# Patient Record
Sex: MALE | Race: WHITE | NOT HISPANIC OR LATINO | URBAN - METROPOLITAN AREA
[De-identification: names, ages, dates, MRNs, and addresses within clinical notes are randomized per-mention and may not be internally consistent; named-entity substitution may affect disease eponyms.]

---

## 2018-04-12 LAB
ANION GAP SERPL CALCULATED.3IONS-SCNC: 15.4 MM/L
APTT PPP: 36.5 SEC (ref 24.4–37.6)
BACTERIA UR QL AUTO: ABNORMAL /HPF
BASOPHILS # BLD AUTO: 0.1 X3/UL (ref 0–0.3)
BASOPHILS # BLD AUTO: 1.3 % (ref 0–2)
BILIRUB UR QL STRIP: NEGATIVE
BUN SERPL-MCNC: 13 MG/DL (ref 7–25)
CALCIUM SERPL-MCNC: 9.7 MG/DL (ref 8.6–10.5)
CHLORIDE SERPL-SCNC: 104 MM/L (ref 98–107)
CLARITY UR: CLEAR
CO2 SERPL-SCNC: 24 MM/L (ref 21–31)
COLOR UR: YELLOW
CREAT SERPL-MCNC: 1.2 MG/DL (ref 0.7–1.3)
DEPRECATED RDW RBC AUTO: 14.2 %
EGFR (HISTORICAL): > 60 GFR
EGFR AFRICAN AMERICAN (HISTORICAL): > 60 GFR
EOSINOPHIL # BLD AUTO: 0.3 X3/UL (ref 0–0.5)
EOSINOPHIL NFR BLD AUTO: 2.4 % (ref 0–5)
GLUCOSE (HISTORICAL): 95 MG/DL (ref 65–99)
GLUCOSE UR STRIP-MCNC: NEGATIVE MG/DL
HCT VFR BLD AUTO: 43.4 % (ref 42–52)
HGB BLD-MCNC: 14.7 G/DL (ref 14–18)
HGB UR QL STRIP.AUTO: ABNORMAL
INR PPP: 1.04 (ref 0.9–1.5)
KETONES UR STRIP-MCNC: NEGATIVE MG/DL
LEUKOCYTE ESTERASE UR QL STRIP: NEGATIVE
LYMPHOCYTES # BLD AUTO: 2.1 X3/UL (ref 1.2–4.2)
LYMPHOCYTES NFR BLD AUTO: 19.8 % (ref 20.5–51.1)
MCH RBC QN AUTO: 28.8 PG (ref 26–34)
MCHC RBC AUTO-ENTMCNC: 33.9 G/DL (ref 31–37)
MCV RBC AUTO: 85.2 FL (ref 81–99)
MONOCYTES # BLD AUTO: 0.8 X3/UL (ref 0–1)
MONOCYTES NFR BLD AUTO: 7.7 % (ref 1.7–12)
MUCUS THREADS (HISTORICAL): PRESENT
NEUTROPHILS # BLD AUTO: 7.4 X3/UL (ref 1.4–6.5)
NEUTS SEG NFR BLD AUTO: 68.8 % (ref 42.2–75.2)
NITRITE UR QL STRIP: NEGATIVE
NON-SQ EPI CELLS URNS QL MICRO: ABNORMAL /LPF
OSMOLALITY, SERUM (HISTORICAL): 277 MOSM (ref 262–291)
PH UR STRIP.AUTO: 5.5 [PH] (ref 4.5–8)
PLATELET # BLD AUTO: 389 X3/UL (ref 130–400)
PMV BLD AUTO: 8 FL
POTASSIUM SERPL-SCNC: 4.4 MM/L (ref 3.5–5.5)
PROT UR STRIP-MCNC: NEGATIVE MG/DL
PROTHROMBIN TIME (HISTORICAL): 12 SEC (ref 10.1–12.9)
RBC # BLD AUTO: 5.09 X6/UL (ref 4.3–5.9)
RBC #/AREA URNS AUTO: ABNORMAL /HPF
SODIUM SERPL-SCNC: 139 MM/L (ref 134–143)
SP GR UR STRIP.AUTO: >= 1.03 (ref 1–1.03)
UROBILINOGEN UR QL STRIP.AUTO: 0.2 EU/DL (ref 0.2–8)
WBC # BLD AUTO: 10.7 X3/UL (ref 4.8–10.8)
WBC #/AREA URNS AUTO: ABNORMAL /HPF

## 2019-02-25 ENCOUNTER — HOSPITAL ENCOUNTER (EMERGENCY)
Facility: HOSPITAL | Age: 44
Discharge: HOME | End: 2019-02-25
Attending: EMERGENCY MEDICINE | Admitting: EMERGENCY MEDICINE

## 2019-02-25 VITALS
HEIGHT: 70 IN | WEIGHT: 260 LBS | RESPIRATION RATE: 16 BRPM | SYSTOLIC BLOOD PRESSURE: 146 MMHG | HEART RATE: 93 BPM | TEMPERATURE: 99 F | BODY MASS INDEX: 37.22 KG/M2 | DIASTOLIC BLOOD PRESSURE: 96 MMHG | OXYGEN SATURATION: 97 %

## 2019-02-25 DIAGNOSIS — K05.10 GINGIVITIS: Primary | ICD-10-CM

## 2019-02-25 PROCEDURE — 99282 EMERGENCY DEPT VISIT SF MDM: CPT

## 2019-02-25 PROCEDURE — 96372 THER/PROPH/DIAG INJ SC/IM: CPT

## 2019-02-25 RX ORDER — CLINDAMYCIN HYDROCHLORIDE 300 MG/1
300 CAPSULE ORAL EVERY 6 HOURS
Qty: 40 CAPSULE | Refills: 0 | Status: SHIPPED | OUTPATIENT
Start: 2019-02-25 | End: 2019-03-07

## 2019-02-25 RX ORDER — DEXAMETHASONE SODIUM PHOSPHATE 4 MG/ML
4.5 INJECTION, SOLUTION INTRA-ARTICULAR; INTRALESIONAL; INTRAMUSCULAR; INTRAVENOUS; SOFT TISSUE ONCE
Status: COMPLETED | OUTPATIENT
Start: 2019-02-25 | End: 2019-02-25

## 2019-02-25 RX ORDER — CLINDAMYCIN HYDROCHLORIDE 150 MG/1
300 CAPSULE ORAL ONCE
Status: COMPLETED | OUTPATIENT
Start: 2019-02-25 | End: 2019-02-25

## 2019-02-25 RX ORDER — METHYLPREDNISOLONE 4 MG/1
TABLET ORAL
Qty: 21 TABLET | Refills: 0 | Status: SHIPPED | OUTPATIENT
Start: 2019-02-25 | End: 2020-08-23

## 2019-02-25 RX ADMIN — CLINDAMYCIN HYDROCHLORIDE 300 MG: 150 CAPSULE ORAL at 18:02

## 2019-02-25 RX ADMIN — DEXAMETHASONE SODIUM PHOSPHATE 4.5 MG: 4 INJECTION, SOLUTION INTRAMUSCULAR; INTRAVENOUS at 18:01

## 2019-02-25 NOTE — ED PROVIDER NOTES
History  Chief Complaint   Patient presents with    Oral Swelling     drainage     Patient reports to the emergency department with complaint of dental and gum pain  Patient for approximately 2 weeks has had swelling in the gums with discharge and foul taste  Patient was taking over-the-counter ibuprofen and Tylenol with little relief  Today patient began to have a headache emanating from his left lower gums and radiated to his left face and into the left jaw  History provided by:  Patient and spouse      None       History reviewed  No pertinent past medical history  Past Surgical History:   Procedure Laterality Date    ARTHROSCOPY KNEE      FASCIOTOMY      LEG SURGERY      TONSILLECTOMY      VASECTOMY         History reviewed  No pertinent family history  I have reviewed and agree with the history as documented  Social History     Tobacco Use    Smoking status: Never Smoker    Smokeless tobacco: Current User     Types: Chew   Substance Use Topics    Alcohol use: Not Currently    Drug use: Never        Review of Systems   Constitutional: Negative for chills and fever  HENT: Positive for dental problem  Negative for rhinorrhea and sore throat  Swelling arms with discharge for 2 weeks getting worse over time   Eyes: Negative for visual disturbance  Respiratory: Negative for cough and shortness of breath  Cardiovascular: Negative for chest pain and leg swelling  Gastrointestinal: Negative for abdominal pain, diarrhea, nausea and vomiting  Genitourinary: Negative for dysuria  Musculoskeletal: Negative for back pain and myalgias  Skin: Negative for rash  Neurological: Negative for dizziness and headaches  Psychiatric/Behavioral: Negative for confusion  All other systems reviewed and are negative  Physical Exam  Physical Exam   Constitutional: He is oriented to person, place, and time  He appears well-developed and well-nourished     HENT:   Head: Normocephalic and atraumatic  Right Ear: External ear normal    Left Ear: External ear normal    Nose: Nose normal    Mouth/Throat: Oropharynx is clear and moist    Left lower gingiva with edema and scant foamy discharge, there are no teeth in the area of the gingival edema, there is tenderness with palpation to the left injuring without fluctuance or abscess palpable, there is tenderness with palpation of the left lower jaw externally  There is some missing dentition as well as some scant case seen in other molars  Eyes: Pupils are equal, round, and reactive to light  Conjunctivae and EOM are normal  No scleral icterus  Neck: Normal range of motion  Neck supple  No JVD present  No tracheal deviation present  Pulmonary/Chest: Effort normal  No respiratory distress  Musculoskeletal: Normal range of motion  He exhibits no edema or tenderness  Lymphadenopathy:     He has cervical adenopathy (Large tender left anterior cervical nodes upper chain)  Neurological: He is alert and oriented to person, place, and time  No cranial nerve deficit or sensory deficit  He exhibits normal muscle tone  5/5 motor, nl sens   Skin: Skin is warm and dry  Capillary refill takes less than 2 seconds  Psychiatric: He has a normal mood and affect  His behavior is normal    Nursing note and vitals reviewed        Vital Signs  ED Triage Vitals [02/25/19 1752]   Temperature Pulse Respirations Blood Pressure SpO2   99 °F (37 2 °C) 93 16 146/96 97 %      Temp Source Heart Rate Source Patient Position - Orthostatic VS BP Location FiO2 (%)   Tympanic -- Sitting Left arm --      Pain Score       6           Vitals:    02/25/19 1752   BP: 146/96   Pulse: 93   Patient Position - Orthostatic VS: Sitting       Visual Acuity      ED Medications  Medications   dexamethasone (DECADRON) injection 4 5 mg (4 5 mg Intramuscular Given 2/25/19 1801)   clindamycin (CLEOCIN) capsule 300 mg (300 mg Oral Given 2/25/19 1802)       Diagnostic Studies  Results Reviewed     None                 No orders to display              Procedures  Procedures       Phone Contacts  ED Phone Contact    ED Course                               MDM    Disposition  Final diagnoses:   Gingivitis     Time reflects when diagnosis was documented in both MDM as applicable and the Disposition within this note     Time User Action Codes Description Comment    2/25/2019  5:58 PM Richard Suncrest Add [K08 89] Pain, dental     2/25/2019  5:59 PM Richard Suncrest Remove [K08 89] Pain, dental     2/25/2019  5:59 PM [facility], [id number] [U57 [id number]] Gingivitis       ED Disposition     ED Disposition Condition Date/Time Comment    Discharge Stable Mon Feb 25, 2019  5:58 PM Kat Fail discharge to home/self care  Follow-up Information     Follow up With Specialties Details Why Contact Info      Schedule an appointment as soon as possible for a visit in 3 days  See your dentist on Thursday 2-  Discharge Medication List as of 2/25/2019  6:03 PM      START taking these medications    Details   clindamycin (CLEOCIN) 300 MG capsule Take 1 capsule (300 mg total) by mouth every 6 (six) hours for 10 days, Starting Mon 2/25/2019, Until Thu 3/7/2019, Print      methylPREDNISolone 4 MG tablet therapy pack Use as directed on package, Print           No discharge procedures on file      ED Provider  Electronically Signed by           Loren Miller DO  02/25/19 0430

## 2019-04-30 ENCOUNTER — HOSPITAL ENCOUNTER (EMERGENCY)
Facility: HOSPITAL | Age: 44
Discharge: HOME | End: 2019-04-30
Attending: EMERGENCY MEDICINE | Admitting: EMERGENCY MEDICINE

## 2019-04-30 ENCOUNTER — APPOINTMENT (EMERGENCY)
Dept: RADIOLOGY | Facility: HOSPITAL | Age: 44
End: 2019-04-30

## 2019-04-30 VITALS
BODY MASS INDEX: 37.22 KG/M2 | RESPIRATION RATE: 18 BRPM | DIASTOLIC BLOOD PRESSURE: 100 MMHG | OXYGEN SATURATION: 96 % | HEIGHT: 70 IN | SYSTOLIC BLOOD PRESSURE: 145 MMHG | WEIGHT: 260 LBS | HEART RATE: 91 BPM | TEMPERATURE: 97.2 F

## 2019-04-30 DIAGNOSIS — M25.562 LEFT KNEE PAIN: Primary | ICD-10-CM

## 2019-04-30 PROCEDURE — 73562 X-RAY EXAM OF KNEE 3: CPT

## 2019-04-30 PROCEDURE — 96372 THER/PROPH/DIAG INJ SC/IM: CPT

## 2019-04-30 PROCEDURE — 99283 EMERGENCY DEPT VISIT LOW MDM: CPT

## 2019-04-30 RX ORDER — KETOROLAC TROMETHAMINE 30 MG/ML
30 INJECTION, SOLUTION INTRAMUSCULAR; INTRAVENOUS ONCE
Status: DISCONTINUED | OUTPATIENT
Start: 2019-04-30 | End: 2019-04-30 | Stop reason: HOSPADM

## 2019-04-30 RX ORDER — ACETAMINOPHEN AND CODEINE PHOSPHATE 300; 30 MG/1; MG/1
1-2 TABLET ORAL EVERY 6 HOURS PRN
Qty: 15 TABLET | Refills: 0 | Status: SHIPPED | OUTPATIENT
Start: 2019-04-30 | End: 2019-05-10

## 2019-04-30 RX ORDER — MORPHINE SULFATE 4 MG/ML
4 INJECTION, SOLUTION INTRAMUSCULAR; INTRAVENOUS ONCE
Status: COMPLETED | OUTPATIENT
Start: 2019-04-30 | End: 2019-04-30

## 2019-04-30 RX ORDER — HYDROCODONE BITARTRATE AND ACETAMINOPHEN 5; 325 MG/1; MG/1
1 TABLET ORAL ONCE
Status: COMPLETED | OUTPATIENT
Start: 2019-04-30 | End: 2019-04-30

## 2019-04-30 RX ORDER — NAPROXEN 500 MG/1
500 TABLET ORAL 2 TIMES DAILY PRN
Qty: 30 TABLET | Refills: 0 | Status: SHIPPED | OUTPATIENT
Start: 2019-04-30 | End: 2020-08-23

## 2019-04-30 RX ADMIN — MORPHINE SULFATE 4 MG: 4 INJECTION INTRAVENOUS at 00:47

## 2019-04-30 RX ADMIN — HYDROCODONE BITARTRATE AND ACETAMINOPHEN 1 TABLET: 5; 325 TABLET ORAL at 02:02

## 2019-07-29 ENCOUNTER — OFFICE VISIT (OUTPATIENT)
Dept: URGENT CARE | Facility: CLINIC | Age: 44
End: 2019-07-29
Payer: COMMERCIAL

## 2019-07-29 VITALS
HEART RATE: 85 BPM | SYSTOLIC BLOOD PRESSURE: 120 MMHG | RESPIRATION RATE: 20 BRPM | HEIGHT: 70 IN | TEMPERATURE: 98 F | BODY MASS INDEX: 36.79 KG/M2 | WEIGHT: 257 LBS | DIASTOLIC BLOOD PRESSURE: 70 MMHG | OXYGEN SATURATION: 99 %

## 2019-07-29 DIAGNOSIS — M10.9 ACUTE GOUT INVOLVING TOE OF RIGHT FOOT, UNSPECIFIED CAUSE: Primary | ICD-10-CM

## 2019-07-29 PROCEDURE — 96372 THER/PROPH/DIAG INJ SC/IM: CPT | Performed by: PHYSICIAN ASSISTANT

## 2019-07-29 PROCEDURE — G0382 LEV 3 HOSP TYPE B ED VISIT: HCPCS | Performed by: PHYSICIAN ASSISTANT

## 2019-07-29 RX ORDER — DEXAMETHASONE SODIUM PHOSPHATE 10 MG/ML
10 INJECTION, SOLUTION INTRAMUSCULAR; INTRAVENOUS ONCE
Status: COMPLETED | OUTPATIENT
Start: 2019-07-29 | End: 2019-07-29

## 2019-07-29 RX ADMIN — DEXAMETHASONE SODIUM PHOSPHATE 10 MG: 10 INJECTION, SOLUTION INTRAMUSCULAR; INTRAVENOUS at 19:29

## 2019-07-29 NOTE — PROGRESS NOTES
[facility]        NAME: Gus Nassar is a 37 y o  male  : 1975    MRN: [id number]  DATE: 2019  TIME: 7:29 PM    Assessment and Plan   Acute gout involving toe of right foot, unspecified cause [M10 9]  1  Acute gout involving toe of right foot, unspecified cause  dexamethasone (PF) (DECADRON) injection 10 mg         Patient Instructions     Take Tylenol or Motrin as needed for pain  The steroid injection number given today will last 72 hours  Follow up with PCP in 3-5 days  Proceed to  ER if symptoms worsen  Chief Complaint     Chief Complaint   Patient presents with    Foot Pain     right great toe and top of foot pain for 1 day         History of Present Illness       Patient presents with redness swelling and pain to the right great toe since this morning  There is no known injury but patient states he could have struck it off the foot oard while sleeping  Previous history of gout  There is no ecchymosis  Review of Systems   Review of Systems   Constitutional: Negative for chills and fever  Gastrointestinal: Negative for nausea and vomiting  Musculoskeletal: Positive for arthralgias  Skin: Negative for rash  Neurological: Negative for weakness and numbness  Hematological: Negative for adenopathy           Current Medications       Current Outpatient Medications:     methylPREDNISolone 4 MG tablet therapy pack, Use as directed on package (Patient not taking: Reported on 2019), Disp: 21 tablet, Rfl: 0    naproxen (NAPROSYN) 500 mg tablet, Take 1 tablet (500 mg total) by mouth 2 (two) times a day as needed for mild pain or moderate pain (Patient not taking: Reported on 2019), Disp: 30 tablet, Rfl: 0    Current Facility-Administered Medications:     dexamethasone (PF) (DECADRON) injection 10 mg, 10 mg, Intramuscular, Once, Keegan Jones PA-C    Current Allergies     Allergies as of 2019 - Reviewed 2019   Allergen Reaction Noted    Penicillins Hives and Throat Swelling 10/19/2009    Tramadol  02/25/2019            The following portions of the patient's history were reviewed and updated as appropriate: allergies, current medications, past family history, past medical history, past social history, past surgical history and problem list      History reviewed  No pertinent past medical history  Past Surgical History:   Procedure Laterality Date    ARTHROSCOPY KNEE      FASCIOTOMY      LEG SURGERY      OSTEOTOMY      TONSILLECTOMY      VASECTOMY         History reviewed  No pertinent family history  Medications have been verified  Objective   /70   Pulse 85   Temp 98 °F (36 7 °C) (Tympanic)   Resp 20   Ht 5' 10" (1 778 m)   Wt 117 kg (257 lb)   SpO2 99%   BMI 36 88 kg/m²        Physical Exam     Physical Exam   Constitutional: He is oriented to person, place, and time  He appears well-developed and well-nourished  HENT:   Head: Normocephalic and atraumatic  Neck: Normal range of motion  Cardiovascular: Normal rate and regular rhythm  Pulmonary/Chest: Effort normal    Musculoskeletal:   Erythema, warmth and swelling to the right 1st MTP joint  No lymphatic streaking  Capillary refill less than 2 seconds  Decreased range of motion secondary to pain  Neurological: He is alert and oriented to person, place, and time  Skin: Skin is warm and dry  Psychiatric: He has a normal mood and affect  His behavior is normal    Nursing note and vitals reviewed

## 2019-07-29 NOTE — PATIENT INSTRUCTIONS
Low Purine Diet   WHAT YOU NEED TO KNOW:   A low-purine diet is a meal plan based on foods that are low in purine content  Purine is a substance that is found in foods and is produced naturally by the body  Purines are broken down by the body and changed to uric acid  The kidneys normally filter the uric acid, and it leaves the body through the urine  However, people with gout sometimes have a buildup of uric acid in the blood  This buildup of uric acid can cause swelling and pain (a gout attack)  A low-purine diet may help to treat and prevent gout attacks  DISCHARGE INSTRUCTIONS:   Foods to include: The following foods are low in purine  · Eggs, nuts, and peanut butter    · Low-fat and fat free cheese and ice cream    · Skim or 1% milk    · Soup made without meat extract or broth    · Vegetables that are not on the medium-purine list below    · All fruit and fruit juice    · Bread, pasta, rice, cake, cornbread, and popcorn    · Water, soda, tea, coffee, and cocoa    · Sugar, sweets, and gelatin    · Fat and oil  Foods to limit:   · Medium-purine foods:      ¨ Meats:  Limit the following to 4 to 6 ounces each day  ¨ Meat and poultry     ¨ Crab, lobster, oysters, and shrimp    ¨ Vegetables:  Limit the following vegetables to ½ cup each day  ¨ Asparagus    ¨ Cauliflower    ¨ Spinach    ¨ Mushrooms    ¨ Green peas    ¨ Beans, peas, and lentils (limit to 1 cup each day)    ¨ Oats and oatmeal (limit to ? cup uncooked each day)    ¨ Wheat germ and bran (limit to ¼ cup each day)    · High-purine foods:  Limit or avoid foods high in purine  ¨ Anchovies, sardines, scallops, and mussels    ¨ Tuna, codfish, herring, and [employer], like goose and duck    [employer], such as brains, heart, kidney, liver, and sweetbreads    ¨ Gravies and sauces made with meat    ¨ Yeast extracts taken in the form of a supplement  Other guidelines to follow:   · Increase liquid intake    Drink 8 to 16 (eight-ounce) cups of liquid each day  At least half of the liquid you drink should be water  Liquid can help your body get rid of extra uric acid  · Limit or avoid alcohol  Alcohol (especially beer) increases your risk of a gout attack  Beer contains a high amount of purine  · Maintain a healthy weight  If you are overweight, you should lose weight slowly  Weight loss can help decrease the amount of stress on your joints  Regular exercise can help you lose weight if you are overweight, or maintain your weight if you are at a normal weight  Talk to your healthcare provider before you begin an exercise program   © 2017 [employer] Information is for End User's use only and may not be sold, redistributed or otherwise used for commercial purposes  All illustrations and images included in CareNotes® are the copyrighted property of [employer]  or [employer] Sw[employer]  The above information is an  only  It is not intended as medical advice for individual conditions or treatments  Talk to your doctor, nurse or pharmacist before following any medical regimen to see if it is safe and effective for you  Gout   AMBULATORY CARE:   Gout  is a form of arthritis that causes severe joint pain and stiffness  Acute gout pain starts suddenly, gets worse quickly, and stops on its own  Acute gout can become chronic and cause permanent damage to your joints  Seek care immediately if:   · You have severe pain in one or more of your joints that you cannot tolerate  · You have a fever or redness that spreads beyond the joint area  Contact your healthcare provider if:   · You have new symptoms, such as a rash, after you start gout treatment  · Your joint pain and swelling do not go away, even after treatment  · You are not urinating as much or as often as you usually do  · You have trouble taking your gout medicines      · You have questions or concerns about your condition or care   Stages of gout:   · Hyperuricemia  starts with high levels of uric acid  Hyperuricemia is not gout, but it increases your risk for gout  You may have no symptoms at this stage, and it usually does not need treatment  · Acute gouty arthritis  starts with a sudden attack of pain and swelling, usually in 1 joint  The attack may last from a few days to 2 weeks  · Intercritical gout  is the time between attacks  You may go months or years without another attack  You will not have joint pain or stiffness, but this does not mean your gout is cured  You will still need treatment to prevent chronic gout  · Chronic tophaceous gout  develops if gout is not treated  Large amounts of uric acid crystals, called tophi, collect around your joints  The crystals can destroy or deform the joints  Gout attacks occur more often, and last hours to weeks  More than 1 joint may be painful and swollen  At this stage, gout symptoms do not go away on their own  Treatment  can make your symptoms stop sooner, prevent attacks, and decrease your risk of joint damage  You may need any of the following:  · Medicines:      ¨ NSAIDs , such as ibuprofen, help decrease swelling, pain, and fever  This medicine is available with or without a doctor's order  NSAIDs can cause stomach bleeding or kidney problems in certain people  If you take blood thinner medicine, always ask your healthcare provider if NSAIDs are safe for you  Always read the medicine label and follow directions  ¨ Gout medicine  decreases joint pain and swelling  It may also be given to prevent new gout attacks  ¨ Steroids  reduce inflammation and can help your joint stiffness and pain during gout attacks  ¨ Uric acid medicine  may be given to reduce the amount of uric acid your body makes  Some medicines may help you pass more uric acid when you urinate  ¨ Take your medicine as directed    Contact your healthcare provider if you think your medicine is not helping or if you have side effects  Tell him or her if you are allergic to any medicine  Keep a list of the medicines, vitamins, and herbs you take  Include the amounts, and when and why you take them  Bring the list or the pill bottles to follow-up visits  Carry your medicine list with you in case of an emergency  · Surgery  called a bone graft may be needed for tophi that are painful or infected  Bone in the joint may be replaced with bone taken from another place in your body  Ask your healthcare provider for more information about bone graft surgery  Manage gout:   · Rest your painful joint so it can heal   Your healthcare provider may recommend crutches or a walker if the affected joint is in a leg  · Apply ice to your joint  Ice decreases pain and swelling  Use an ice pack, or put crushed ice in a plastic bag  Cover the ice pack or bag with a towel before you apply it to your painful joint  Apply ice for 15 to 20 minutes every hour, or as directed  · Elevate your joint  Elevation helps reduce swelling and pain  Raise your joint above the level of your heart as often as you can  Prop your painful joint on pillows to keep it above your heart comfortably  · Go to physical therapy if directed  A physical therapist can teach you exercises to improve flexibility and range of motion  Prevent gout attacks:   · Do not eat high-purine foods  These foods include meats, seafood, asparagus, spinach, cauliflower, and some types of beans  Healthcare providers may tell you to eat more low-fat milk products, such as yogurt  Milk products may decrease your risk for gout attacks  Vitamin C and coffee may also help  Your healthcare provider or dietitian can help you create a meal plan  · Drink more liquids as directed  Liquids such as water help remove uric acid from your body  Ask how much liquid to drink each day and which liquids are best for you  · Manage your weight    Weight loss may decrease the amount of uric acid in your body  Exercise can help you lose weight  Talk to your healthcare provider about the best exercises for you  · Control your blood sugar level if you have diabetes  Keep your blood sugar level in a normal range  This can help prevent gout attacks  · Limit or do not drink alcohol as directed  Alcohol can trigger a gout attack  Alcohol also increases your risk for dehydration  Ask your healthcare provider if alcohol is safe for you  Follow up with your healthcare provider as directed:  Write down your questions so you remember to ask them during your visits  © 2017 [employer]  Information is for End User's use only and may not be sold, redistributed or otherwise used for commercial purposes  All illustrations and images included in CareNotes® are the copyrighted property of [employer] Inc  or Domen[employer] Swanson  The above information is an  only  It is not intended as medical advice for individual conditions or treatments  Talk to your doctor, nurse or pharmacist before following any medical regimen to see if it is safe and effective for you

## 2019-09-03 PROCEDURE — 99283 EMERGENCY DEPT VISIT LOW MDM: CPT

## 2019-09-04 ENCOUNTER — HOSPITAL ENCOUNTER (EMERGENCY)
Facility: HOSPITAL | Age: 44
Discharge: HOME | End: 2019-09-04
Attending: INTERNAL MEDICINE | Admitting: INTERNAL MEDICINE

## 2019-09-04 VITALS
RESPIRATION RATE: 20 BRPM | HEART RATE: 67 BPM | DIASTOLIC BLOOD PRESSURE: 94 MMHG | TEMPERATURE: 98.2 F | BODY MASS INDEX: 37.22 KG/M2 | WEIGHT: 260 LBS | OXYGEN SATURATION: 97 % | SYSTOLIC BLOOD PRESSURE: 145 MMHG | HEIGHT: 70 IN

## 2019-09-04 DIAGNOSIS — M10.9 PODAGRA: Primary | ICD-10-CM

## 2019-09-04 PROCEDURE — 96372 THER/PROPH/DIAG INJ SC/IM: CPT

## 2019-09-04 RX ORDER — PREDNISONE 10 MG/1
TABLET ORAL
Qty: 20 TABLET | Refills: 0 | Status: SHIPPED | OUTPATIENT
Start: 2019-09-04 | End: 2020-08-23

## 2019-09-04 RX ORDER — HYDROCODONE BITARTRATE AND ACETAMINOPHEN 5; 325 MG/1; MG/1
1 TABLET ORAL EVERY 4 HOURS PRN
Status: DISCONTINUED | OUTPATIENT
Start: 2019-09-04 | End: 2019-09-04 | Stop reason: HOSPADM

## 2019-09-04 RX ORDER — KETOROLAC TROMETHAMINE 30 MG/ML
60 INJECTION, SOLUTION INTRAMUSCULAR; INTRAVENOUS ONCE
Status: COMPLETED | OUTPATIENT
Start: 2019-09-04 | End: 2019-09-04

## 2019-09-04 RX ORDER — DEXAMETHASONE SODIUM PHOSPHATE 4 MG/ML
10 INJECTION, SOLUTION INTRA-ARTICULAR; INTRALESIONAL; INTRAMUSCULAR; INTRAVENOUS; SOFT TISSUE ONCE
Status: COMPLETED | OUTPATIENT
Start: 2019-09-04 | End: 2019-09-04

## 2019-09-04 RX ORDER — HYDROCODONE BITARTRATE AND ACETAMINOPHEN 5; 325 MG/1; MG/1
1 TABLET ORAL ONCE
Status: COMPLETED | OUTPATIENT
Start: 2019-09-04 | End: 2019-09-04

## 2019-09-04 RX ADMIN — DEXAMETHASONE SODIUM PHOSPHATE 10 MG: 4 INJECTION, SOLUTION INTRAMUSCULAR; INTRAVENOUS at 01:36

## 2019-09-04 RX ADMIN — KETOROLAC TROMETHAMINE 60 MG: 30 INJECTION, SOLUTION INTRAMUSCULAR; INTRAVENOUS at 01:40

## 2019-09-04 RX ADMIN — HYDROCODONE BITARTRATE AND ACETAMINOPHEN 1 TABLET: 5; 325 TABLET ORAL at 01:35

## 2019-09-04 NOTE — ED PROVIDER NOTES
History  Chief Complaint   Patient presents with    Gout Pain     right foot-2nd episode over 1 month     73-year-old several weeks ago had a case of gout  He has a recurrent flare now  It is in his right great toe the MTP joint  No trauma  No fever  Denies significant past medical history other than this prior          Prior to Admission Medications   Prescriptions Last Dose Informant Patient Reported? Taking? methylPREDNISolone 4 MG tablet therapy pack   No No   Sig: Use as directed on package   Patient not taking: Reported on 4/30/2019   naproxen (NAPROSYN) 500 mg tablet   No No   Sig: Take 1 tablet (500 mg total) by mouth 2 (two) times a day as needed for mild pain or moderate pain   Patient not taking: Reported on 7/29/2019      Facility-Administered Medications: None       Past Medical History:   Diagnosis Date    Gout        Past Surgical History:   Procedure Laterality Date    ARTHROSCOPY KNEE      FASCIOTOMY      LEG SURGERY      OSTEOTOMY      TONSILLECTOMY      VASECTOMY         History reviewed  No pertinent family history  I have reviewed and agree with the history as documented  Social History     Tobacco Use    Smoking status: Never Smoker    Smokeless tobacco: Current User     Types: Chew   Substance Use Topics    Alcohol use: Not Currently    Drug use: Never        Review of Systems   Constitutional: Negative for chills and fever  HENT: Negative for rhinorrhea and sore throat  Eyes: Negative for visual disturbance  Respiratory: Negative for cough and shortness of breath  Cardiovascular: Negative for chest pain and leg swelling  Gastrointestinal: Negative for abdominal pain, diarrhea, nausea and vomiting  Genitourinary: Negative for dysuria  Musculoskeletal: Negative for back pain and myalgias  Swollen painful right great toe   Skin: Negative for rash  Neurological: Negative for dizziness and headaches  Psychiatric/Behavioral: Negative for confusion  All other systems reviewed and are negative  Physical Exam  Physical Exam   Constitutional: He is oriented to person, place, and time  He appears well-developed and well-nourished  HENT:   Nose: Nose normal    Mouth/Throat: Oropharynx is clear and moist  No oropharyngeal exudate  Eyes: Pupils are equal, round, and reactive to light  Conjunctivae and EOM are normal  No scleral icterus  Neck: Normal range of motion  Neck supple  No JVD present  No tracheal deviation present  Cardiovascular: Normal rate, regular rhythm and normal heart sounds  No murmur heard  Pulmonary/Chest: Effort normal and breath sounds normal  No respiratory distress  He has no wheezes  He has no rales  Abdominal: Soft  Bowel sounds are normal  There is no tenderness  There is no guarding  Musculoskeletal: Normal range of motion  He exhibits no edema or tenderness  Swollen tender erythematous right great toe at the MTP joint   Neurological: He is alert and oriented to person, place, and time  No cranial nerve deficit or sensory deficit  He exhibits normal muscle tone  5/5 motor, nl sens   Skin: Skin is warm and dry  Psychiatric: He has a normal mood and affect  His behavior is normal    Nursing note and vitals reviewed        Vital Signs  ED Triage Vitals [09/04/19 0025]   Temperature Pulse Respirations Blood Pressure SpO2   99 °F (37 2 °C) 62 20 125/93 98 %      Temp Source Heart Rate Source Patient Position - Orthostatic VS BP Location FiO2 (%)   Temporal Monitor Sitting Left arm --      Pain Score       7           Vitals:    09/04/19 0025   BP: 125/93   Pulse: 62   Patient Position - Orthostatic VS: Sitting         Visual Acuity      ED Medications  Medications - No data to display    Diagnostic Studies  Results Reviewed     None                 No orders to display              Procedures  Procedures       ED Course                               MDM    Disposition  Final diagnoses:   None     ED Disposition None      Follow-up Information    None         Patient's Medications   Discharge Prescriptions    No medications on file     No discharge procedures on file      ED Provider  Electronically Signed by           Deyvi Carrillo DO  09/04/19 0128

## 2019-12-24 ENCOUNTER — APPOINTMENT (EMERGENCY)
Dept: RADIOLOGY | Facility: HOSPITAL | Age: 44
End: 2019-12-24

## 2019-12-24 ENCOUNTER — HOSPITAL ENCOUNTER (EMERGENCY)
Facility: HOSPITAL | Age: 44
Discharge: HOME | End: 2019-12-24
Attending: EMERGENCY MEDICINE | Admitting: EMERGENCY MEDICINE

## 2019-12-24 VITALS
HEART RATE: 62 BPM | TEMPERATURE: 98.3 F | OXYGEN SATURATION: 97 % | DIASTOLIC BLOOD PRESSURE: 72 MMHG | WEIGHT: 250 LBS | BODY MASS INDEX: 33.86 KG/M2 | RESPIRATION RATE: 18 BRPM | SYSTOLIC BLOOD PRESSURE: 129 MMHG | HEIGHT: 72 IN

## 2019-12-24 DIAGNOSIS — J32.9 SINUSITIS: ICD-10-CM

## 2019-12-24 DIAGNOSIS — J06.9 URI (UPPER RESPIRATORY INFECTION): Primary | ICD-10-CM

## 2019-12-24 DIAGNOSIS — S39.012A BACK STRAIN, INITIAL ENCOUNTER: ICD-10-CM

## 2019-12-24 PROCEDURE — 94760 N-INVAS EAR/PLS OXIMETRY 1: CPT

## 2019-12-24 PROCEDURE — 94640 AIRWAY INHALATION TREATMENT: CPT

## 2019-12-24 PROCEDURE — 96372 THER/PROPH/DIAG INJ SC/IM: CPT

## 2019-12-24 PROCEDURE — 99283 EMERGENCY DEPT VISIT LOW MDM: CPT | Performed by: EMERGENCY MEDICINE

## 2019-12-24 PROCEDURE — 71046 X-RAY EXAM CHEST 2 VIEWS: CPT

## 2019-12-24 PROCEDURE — 99284 EMERGENCY DEPT VISIT MOD MDM: CPT

## 2019-12-24 RX ORDER — KETOROLAC TROMETHAMINE 30 MG/ML
60 INJECTION, SOLUTION INTRAMUSCULAR; INTRAVENOUS ONCE
Status: COMPLETED | OUTPATIENT
Start: 2019-12-24 | End: 2019-12-24

## 2019-12-24 RX ORDER — PREDNISONE 20 MG/1
60 TABLET ORAL DAILY
Status: DISCONTINUED | OUTPATIENT
Start: 2019-12-24 | End: 2019-12-24

## 2019-12-24 RX ORDER — IPRATROPIUM BROMIDE AND ALBUTEROL SULFATE 2.5; .5 MG/3ML; MG/3ML
3 SOLUTION RESPIRATORY (INHALATION) ONCE
Status: COMPLETED | OUTPATIENT
Start: 2019-12-24 | End: 2019-12-24

## 2019-12-24 RX ORDER — CYCLOBENZAPRINE HCL 10 MG
10 TABLET ORAL 2 TIMES DAILY PRN
Qty: 20 TABLET | Refills: 0 | Status: SHIPPED | OUTPATIENT
Start: 2019-12-24 | End: 2020-08-23

## 2019-12-24 RX ORDER — CYCLOBENZAPRINE HCL 10 MG
10 TABLET ORAL ONCE
Status: COMPLETED | OUTPATIENT
Start: 2019-12-24 | End: 2019-12-24

## 2019-12-24 RX ORDER — PREDNISONE 20 MG/1
60 TABLET ORAL ONCE
Status: COMPLETED | OUTPATIENT
Start: 2019-12-24 | End: 2019-12-24

## 2019-12-24 RX ORDER — ALBUTEROL SULFATE 90 UG/1
1-2 AEROSOL, METERED RESPIRATORY (INHALATION) EVERY 6 HOURS PRN
Qty: 1 INHALER | Refills: 0 | Status: SHIPPED | OUTPATIENT
Start: 2019-12-24 | End: 2020-08-23

## 2019-12-24 RX ORDER — AZITHROMYCIN 250 MG/1
500 TABLET, FILM COATED ORAL ONCE
Status: COMPLETED | OUTPATIENT
Start: 2019-12-24 | End: 2019-12-24

## 2019-12-24 RX ORDER — AZITHROMYCIN 250 MG/1
TABLET, FILM COATED ORAL
Qty: 4 TABLET | Refills: 0 | Status: SHIPPED | OUTPATIENT
Start: 2019-12-25 | End: 2019-12-28

## 2019-12-24 RX ORDER — BENZONATATE 100 MG/1
100 CAPSULE ORAL EVERY 8 HOURS
Qty: 21 CAPSULE | Refills: 0 | Status: SHIPPED | OUTPATIENT
Start: 2019-12-24 | End: 2020-08-23

## 2019-12-24 RX ORDER — PREDNISONE 50 MG/1
50 TABLET ORAL DAILY
Qty: 5 TABLET | Refills: 0 | Status: SHIPPED | OUTPATIENT
Start: 2019-12-24 | End: 2020-08-23

## 2019-12-24 RX ADMIN — PREDNISONE 60 MG: 20 TABLET ORAL at 04:07

## 2019-12-24 RX ADMIN — IPRATROPIUM BROMIDE AND ALBUTEROL SULFATE 3 ML: 2.5; .5 SOLUTION RESPIRATORY (INHALATION) at 04:20

## 2019-12-24 RX ADMIN — CYCLOBENZAPRINE HYDROCHLORIDE 10 MG: 10 TABLET, FILM COATED ORAL at 04:07

## 2019-12-24 RX ADMIN — KETOROLAC TROMETHAMINE 60 MG: 30 INJECTION, SOLUTION INTRAMUSCULAR; INTRAVENOUS at 04:39

## 2019-12-24 RX ADMIN — AZITHROMYCIN 500 MG: 250 TABLET, FILM COATED ORAL at 04:45

## 2019-12-24 NOTE — DISCHARGE INSTRUCTIONS
RETURN IF WORSE IN ANY WAY:   INCREASED PAIN, FEVER OR FLU LIKE SYMPTOMS, OR NEW AND CONCERNING SYMPTOMS SIGNS OR SYMPTOMS:    PLEASE CALL YOUR PRIMARY DOCTOR IN THE MORNING TO SET UP FOLLOW UP   PLEASE REVIEW THE WORK UP RESULTS WITH YOUR DOCTOR

## 2019-12-24 NOTE — ED PROVIDER NOTES
History  Chief Complaint   Patient presents with    Cough      for last week with incresed SOB, pain in bilateral rib area     52 YO M  PMH:  none  Social: non smoker - does chew, social ETOH, no drugs    Pt here for evaluation of cough for 1 5-2 weeks  Cough is NP    Tmax:   Unknown    Interventions:  Robitussin, Mucinex      Denies Abdominal discomfort  Denies Nausea, No vomiting  No Diarrhea     No urinary complaints:  No dysuria/hematuria/frequency     No cp or sob  No respiratory distress, no stridor  Pt does report wheezing    No oral sores   No swollen lymph nodes    Rash:    None noted    Pt has been eating and drinking well  Reports on and off headache, mild   No neck stiffness      No recent travel      Sick contacts: None  No recent hospitalization           History provided by:  Patient  Cough   Cough characteristics:  Dry  Severity:  Mild  Onset quality:  Gradual  Timing:  Intermittent  Progression:  Unchanged  Chronicity:  New  Smoker: no    Context: upper respiratory infection    Context: not occupational exposure, not sick contacts and not smoke exposure    Relieved by:  Nothing  Worsened by:  Nothing  Ineffective treatments:  None tried  Associated symptoms: headaches, sinus congestion and wheezing    Associated symptoms: no chest pain, no chills, no diaphoresis, no ear fullness, no ear pain, no eye discharge, no fever, no myalgias, no rash, no rhinorrhea, no shortness of breath and no sore throat        Prior to Admission Medications   Prescriptions Last Dose Informant Patient Reported? Taking?    methylPREDNISolone 4 MG tablet therapy pack   No No   Sig: Use as directed on package   Patient not taking: Reported on 2019   naproxen (NAPROSYN) 500 mg tablet   No No   Sig: Take 1 tablet (500 mg total) by mouth 2 (two) times a day as needed for mild pain or moderate pain   Patient not taking: Reported on 2019   predniSONE 10 mg tablet Not Taking at Unknown time  No No   Si pills for 2 days, then 3 pills for 2 days, then 2 pills for 2 days then 1 pill for 2 days  Patient not taking: Reported on 12/24/2019      Facility-Administered Medications: None       Past Medical History:   Diagnosis Date    Gout        Past Surgical History:   Procedure Laterality Date    ARTHROSCOPY KNEE      FASCIOTOMY      LEG SURGERY      OSTEOTOMY      TONSILLECTOMY      VASECTOMY         History reviewed  No pertinent family history  I have reviewed and agree with the history as documented  Social History     Tobacco Use    Smoking status: Never Smoker    Smokeless tobacco: Current User     Types: Chew   Substance Use Topics    Alcohol use: Not Currently    Drug use: Never        Review of Systems   Constitutional: Negative for chills, diaphoresis, fatigue and fever  HENT: Positive for sinus pressure  Negative for ear pain, rhinorrhea and sore throat  Eyes: Negative for discharge  Respiratory: Positive for cough and wheezing  Negative for shortness of breath and stridor  Cardiovascular: Negative for chest pain, palpitations and leg swelling  Gastrointestinal: Negative for abdominal distention, abdominal pain, constipation, diarrhea and nausea  Musculoskeletal: Positive for back pain  Negative for arthralgias, gait problem, joint swelling and myalgias  Skin: Negative for rash  Neurological: Positive for headaches  All other systems reviewed and are negative  Physical Exam  Physical Exam   Constitutional: He is oriented to person, place, and time  He appears well-developed and well-nourished  No distress  HENT:   Head: Normocephalic and atraumatic  Nose: Nose normal    Mouth/Throat: Oropharynx is clear and moist  No oropharyngeal exudate  Eyes: Pupils are equal, round, and reactive to light  Conjunctivae and EOM are normal  Right eye exhibits no discharge  Left eye exhibits no discharge  No scleral icterus  Neck: Normal range of motion  Neck supple  No JVD present   No tracheal deviation present  Cardiovascular: Normal rate, regular rhythm, normal heart sounds and intact distal pulses  Exam reveals no gallop and no friction rub  No murmur heard  Pulmonary/Chest: Effort normal and breath sounds normal  No stridor  No respiratory distress  He has no wheezes  He has no rales  He exhibits no tenderness  Abdominal: Soft  Bowel sounds are normal  He exhibits no distension and no mass  There is no tenderness  There is no rebound and no guarding  No hernia  Musculoskeletal: Normal range of motion  He exhibits no edema, tenderness or deformity  Lymphadenopathy:     He has no cervical adenopathy  Neurological: He is alert and oriented to person, place, and time  No cranial nerve deficit or sensory deficit  He exhibits normal muscle tone  Coordination normal    Skin: Skin is warm  Capillary refill takes less than 2 seconds  No rash noted  He is not diaphoretic  No erythema  No pallor  Psychiatric: He has a normal mood and affect   His behavior is normal  Judgment and thought content normal        Vital Signs  ED Triage Vitals [12/24/19 0337]   Temperature Pulse Respirations Blood Pressure SpO2   (!) 97 3 °F (36 3 °C) 71 20 151/97 97 %      Temp Source Heart Rate Source Patient Position - Orthostatic VS BP Location FiO2 (%)   Temporal Monitor Sitting Right arm --      Pain Score       7           Vitals:    12/24/19 0337 12/24/19 0548   BP: 151/97 129/72   Pulse: 71 62   Patient Position - Orthostatic VS: Sitting Lying         Visual Acuity      ED Medications  Medications   ipratropium-albuterol (DUO-NEB) 0 5-2 5 mg/3 mL inhalation solution 3 mL (3 mL Nebulization Given 12/24/19 0420)   cyclobenzaprine (FLEXERIL) tablet 10 mg (10 mg Oral Given 12/24/19 0407)   predniSONE tablet 60 mg (60 mg Oral Given 12/24/19 0407)   ketorolac (TORADOL) injection 60 mg (60 mg Intramuscular Given 12/24/19 0439)   azithromycin (ZITHROMAX) tablet 500 mg (500 mg Oral Given 12/24/19 6985) Diagnostic Studies  Results Reviewed     None                 XR chest 2 views   ED Interpretation by Phill Nix MD (12/24 9288)   nad      Final Result by Federico Macias MD (12/24 5903)      No acute cardiopulmonary disease  Workstation performed: [id number]                    Procedures  Procedures         ED Course  ED Course as of Dec 25 6149   Tue Dec 24, 2019   0400 PATIENT IS HERE PRIMARILY FOR COUGH  HE HAS NORMAL VITAL SIGNS  HE IS ALSO COMPLAINING OF LEFT FLANK PAIN DUE TO COUGHING  I WILL GIVE HIM A MUSCLE RELAXANT FOR THAT  HE IS NOT DRIVING      9729 Pt resting comfortable  Ready to manage from home                                  MDM      Disposition  Final diagnoses:   URI (upper respiratory infection)   Sinusitis   Back strain, initial encounter     Time reflects when diagnosis was documented in both MDM as applicable and the Disposition within this note     Time User Action Codes Description Comment    12/24/2019  5:49 AM Roulette Mitts Add [J06 9] URI (upper respiratory infection)     12/24/2019  5:49 AM Roulette Mitts Add [J32 9] Sinusitis     12/24/2019  5:50 AM Roulette Mitts Add [S39 012A] Back strain, initial encounter       ED Disposition     ED Disposition Condition Date/Time Comment    Discharge Stable Tue Dec 24, 2019  5:50 AM Cookie Gautam discharge to home/self care              Follow-up Information    None         Discharge Medication List as of 12/24/2019  5:53 AM      START taking these medications    Details   albuterol (PROVENTIL HFA,VENTOLIN HFA) 90 mcg/act inhaler Inhale 1-2 puffs every 6 (six) hours as needed for wheezing, Starting Tue 12/24/2019, Normal      azithromycin (ZITHROMAX) 250 mg tablet Take 2 tablets today then 1 tablet daily x 4 days, Normal      benzonatate (TESSALON PERLES) 100 mg capsule Take 1 capsule (100 mg total) by mouth every 8 (eight) hours, Starting Tue 12/24/2019, Normal      cyclobenzaprine (FLEXERIL) 10 mg tablet Take 1 tablet (10 mg total) by mouth 2 (two) times a day as needed for muscle spasms, Starting Tue 12/24/2019, Normal      !! predniSONE 50 mg tablet Take 1 tablet (50 mg total) by mouth daily, Starting Tue 12/24/2019, Normal       !! - Potential duplicate medications found  Please discuss with provider  CONTINUE these medications which have NOT CHANGED    Details   methylPREDNISolone 4 MG tablet therapy pack Use as directed on package, Print      naproxen (NAPROSYN) 500 mg tablet Take 1 tablet (500 mg total) by mouth 2 (two) times a day as needed for mild pain or moderate pain, Starting Tue 4/30/2019, Print      !! predniSONE 10 mg tablet 4 pills for 2 days, then 3 pills for 2 days, then 2 pills for 2 days then 1 pill for 2 days  , Normal       !! - Potential duplicate medications found  Please discuss with provider  No discharge procedures on file      ED Provider  Electronically Signed by           Fernanda Stevens MD  12/25/19 9554

## 2020-03-18 ENCOUNTER — NURSE TRIAGE (OUTPATIENT)
Dept: OTHER | Facility: OTHER | Age: 45
End: 2020-03-18

## 2020-03-18 NOTE — TELEPHONE ENCOUNTER
Was unsure if he has been exposed, but travels on the road to various states  Pt feels like he can treat this at home but was unsure if he could take otc meds for his symptoms

## 2020-03-18 NOTE — TELEPHONE ENCOUNTER
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  Do you have a weakened immune system?" (e g , CHF, COPD, asthma, HIV positive, chemotherapy, renal failure, diabetes mellitus, sickle cell anemia)      no    Protocols used: CORONAVIRUS (COVID-19) EXPOSURE-ADULT-

## 2020-03-18 NOTE — TELEPHONE ENCOUNTER
Regarding: ZHFHT-50 concerns  ----- Message from Boni Cockayne sent at 3/18/2020  2:47 PM EDT -----  "I have a cough, fever, and wheezing " He does not have a PCP He has not traveled or expose to anyone who tested positive  He is a

## 2020-03-20 ENCOUNTER — APPOINTMENT (EMERGENCY)
Dept: CT IMAGING | Facility: HOSPITAL | Age: 45
End: 2020-03-20

## 2020-03-20 ENCOUNTER — HOSPITAL ENCOUNTER (EMERGENCY)
Facility: HOSPITAL | Age: 45
Discharge: HOME | End: 2020-03-20
Attending: INTERNAL MEDICINE | Admitting: INTERNAL MEDICINE

## 2020-03-20 VITALS
OXYGEN SATURATION: 98 % | HEART RATE: 81 BPM | WEIGHT: 260 LBS | TEMPERATURE: 98.3 F | BODY MASS INDEX: 35.21 KG/M2 | DIASTOLIC BLOOD PRESSURE: 83 MMHG | RESPIRATION RATE: 20 BRPM | SYSTOLIC BLOOD PRESSURE: 156 MMHG | HEIGHT: 72 IN

## 2020-03-20 DIAGNOSIS — J06.9 VIRAL URI WITH COUGH: Primary | ICD-10-CM

## 2020-03-20 LAB
ALBUMIN SERPL BCP-MCNC: 4.1 G/DL (ref 3.5–5.7)
ALP SERPL-CCNC: 47 U/L (ref 40–150)
ALT SERPL W P-5'-P-CCNC: 16 U/L (ref 7–52)
ANION GAP SERPL CALCULATED.3IONS-SCNC: 7 MMOL/L (ref 4–13)
AST SERPL W P-5'-P-CCNC: 13 U/L (ref 13–39)
BASOPHILS # BLD AUTO: 0.1 THOUSANDS/ΜL (ref 0–0.1)
BASOPHILS NFR BLD AUTO: 1 % (ref 0–2)
BILIRUB SERPL-MCNC: 0.3 MG/DL (ref 0.2–1)
BUN SERPL-MCNC: 13 MG/DL (ref 7–25)
CALCIUM SERPL-MCNC: 8.9 MG/DL (ref 8.6–10.5)
CHLORIDE SERPL-SCNC: 104 MMOL/L (ref 98–107)
CO2 SERPL-SCNC: 26 MMOL/L (ref 21–31)
CREAT SERPL-MCNC: 1.35 MG/DL (ref 0.7–1.3)
EOSINOPHIL # BLD AUTO: 0.5 THOUSAND/ΜL (ref 0–0.61)
EOSINOPHIL NFR BLD AUTO: 6 % (ref 0–5)
ERYTHROCYTE [DISTWIDTH] IN BLOOD BY AUTOMATED COUNT: 13.3 % (ref 11.5–14.5)
FLUAV RNA NPH QL NAA+PROBE: NORMAL
FLUBV RNA NPH QL NAA+PROBE: NORMAL
GFR SERPL CREATININE-BSD FRML MDRD: 63 ML/MIN/1.73SQ M
GLUCOSE SERPL-MCNC: 102 MG/DL (ref 65–99)
HCT VFR BLD AUTO: 43.5 % (ref 42–47)
HGB BLD-MCNC: 14 G/DL (ref 14–18)
LYMPHOCYTES # BLD AUTO: 2.5 THOUSANDS/ΜL (ref 0.6–4.47)
LYMPHOCYTES NFR BLD AUTO: 27 % (ref 21–51)
MCH RBC QN AUTO: 27.8 PG (ref 26–34)
MCHC RBC AUTO-ENTMCNC: 32.1 G/DL (ref 31–37)
MCV RBC AUTO: 87 FL (ref 81–99)
MONOCYTES # BLD AUTO: 0.8 THOUSAND/ΜL (ref 0.17–1.22)
MONOCYTES NFR BLD AUTO: 9 % (ref 2–12)
NEUTROPHILS # BLD AUTO: 5.4 THOUSANDS/ΜL (ref 1.4–6.5)
NEUTS SEG NFR BLD AUTO: 58 % (ref 42–75)
PLATELET # BLD AUTO: 328 THOUSANDS/UL (ref 149–390)
PMV BLD AUTO: 8 FL (ref 8.6–11.7)
POTASSIUM SERPL-SCNC: 3.9 MMOL/L (ref 3.5–5.5)
PROT SERPL-MCNC: 6.5 G/DL (ref 6.4–8.9)
RBC # BLD AUTO: 5.03 MILLION/UL (ref 4.3–5.9)
RSV RNA NPH QL NAA+PROBE: NORMAL
SODIUM SERPL-SCNC: 137 MMOL/L (ref 134–143)
WBC # BLD AUTO: 9.4 THOUSAND/UL (ref 4.8–10.8)

## 2020-03-20 PROCEDURE — 80053 COMPREHEN METABOLIC PANEL: CPT | Performed by: INTERNAL MEDICINE

## 2020-03-20 PROCEDURE — 85025 COMPLETE CBC W/AUTO DIFF WBC: CPT | Performed by: INTERNAL MEDICINE

## 2020-03-20 PROCEDURE — 99284 EMERGENCY DEPT VISIT MOD MDM: CPT | Performed by: INTERNAL MEDICINE

## 2020-03-20 PROCEDURE — 87635 SARS-COV-2 COVID-19 AMP PRB: CPT | Performed by: INTERNAL MEDICINE

## 2020-03-20 PROCEDURE — 36415 COLL VENOUS BLD VENIPUNCTURE: CPT | Performed by: INTERNAL MEDICINE

## 2020-03-20 PROCEDURE — 87631 RESP VIRUS 3-5 TARGETS: CPT | Performed by: INTERNAL MEDICINE

## 2020-03-20 PROCEDURE — 71250 CT THORAX DX C-: CPT

## 2020-03-20 PROCEDURE — 99284 EMERGENCY DEPT VISIT MOD MDM: CPT

## 2020-03-20 RX ORDER — BENZONATATE 100 MG/1
100 CAPSULE ORAL ONCE
Status: COMPLETED | OUTPATIENT
Start: 2020-03-20 | End: 2020-03-20

## 2020-03-20 RX ORDER — DEXTROMETHORPHAN HYDROBROMIDE AND PROMETHAZINE HYDROCHLORIDE 15; 6.25 MG/5ML; MG/5ML
5 SOLUTION ORAL 4 TIMES DAILY PRN
Qty: 118 ML | Refills: 0 | Status: SHIPPED | OUTPATIENT
Start: 2020-03-20 | End: 2020-08-23

## 2020-03-20 RX ORDER — ALBUTEROL SULFATE 90 UG/1
2 AEROSOL, METERED RESPIRATORY (INHALATION) ONCE
Status: COMPLETED | OUTPATIENT
Start: 2020-03-20 | End: 2020-03-20

## 2020-03-20 RX ORDER — ALBUTEROL SULFATE 90 UG/1
2 AEROSOL, METERED RESPIRATORY (INHALATION) EVERY 4 HOURS PRN
Qty: 1 INHALER | Refills: 0 | Status: SHIPPED | OUTPATIENT
Start: 2020-03-20 | End: 2020-08-23

## 2020-03-20 RX ADMIN — BENZONATATE 100 MG: 100 CAPSULE ORAL at 21:19

## 2020-03-20 RX ADMIN — ALBUTEROL SULFATE 2 PUFF: 90 AEROSOL, METERED RESPIRATORY (INHALATION) at 21:19

## 2020-03-20 NOTE — ED PROVIDER NOTES
History  Chief Complaint   Patient presents with    Cough     dry cough for 4 days     57-year-old , with history of into [address] [place] [address] and [place] over the last week week and half  Over the past 4 days she has had an increasing cough  Today he got home from [place], and went to [address] [place] [address] with his wife  He was feeling short of breath, began to experience some discomfort in his back  He does not take any chronic medications  States he has had fevers as high as 100 8 by thermometer  He has had chills for the last 2 days  His cough is nonproductive  Denies any significant past history  Has had no diarrhea  Slight sinus congestion  No sore throat  Cannot display prior to admission medications because the patient has not been admitted in this contact  Past Medical History:   Diagnosis Date    Gout        Past Surgical History:   Procedure Laterality Date    ARTHROSCOPY KNEE      FASCIOTOMY      LEG SURGERY      OSTEOTOMY      TONSILLECTOMY      VASECTOMY         History reviewed  No pertinent family history  I have reviewed and agree with the history as documented  E-Cigarette/Vaping     E-Cigarette/Vaping Substances     Social History     Tobacco Use    Smoking status: Never Smoker    Smokeless tobacco: Current User     Types: Chew   Substance Use Topics    Alcohol use: Not Currently    Drug use: Never       Review of Systems   Constitutional: Negative for chills and fever  HENT: Negative for rhinorrhea and sore throat  Eyes: Negative for visual disturbance  Respiratory: Positive for cough and chest tightness  Negative for shortness of breath  Has pain in the middle of his back from coughing he thinks   Cardiovascular: Negative for chest pain and leg swelling  Gastrointestinal: Negative for abdominal pain, diarrhea, nausea and vomiting  Genitourinary: Negative for dysuria  Musculoskeletal: Negative for back pain and myalgias  Skin: Negative for rash  Neurological: Negative for dizziness and headaches  Psychiatric/Behavioral: Negative for confusion  All other systems reviewed and are negative  Physical Exam  Physical Exam   Constitutional: He is oriented to person, place, and time  He appears well-developed and well-nourished  HENT:   Nose: Nose normal    Mouth/Throat: Oropharynx is clear and moist  No oropharyngeal exudate  Eyes: Pupils are equal, round, and reactive to light  Conjunctivae and EOM are normal  No scleral icterus  Neck: Normal range of motion  Neck supple  No JVD present  No tracheal deviation present  Cardiovascular: Normal rate, regular rhythm and normal heart sounds  No murmur heard  Pulmonary/Chest: Effort normal  No respiratory distress  He has no wheezes  He has no rales  Fine crackles   Abdominal: Soft  Bowel sounds are normal  There is no tenderness  There is no guarding  Musculoskeletal: Normal range of motion  He exhibits no edema or tenderness  Neurological: He is alert and oriented to person, place, and time  No cranial nerve deficit or sensory deficit  He exhibits normal muscle tone  5/5 motor, nl sens   Skin: Skin is warm and dry  Psychiatric: He has a normal mood and affect  His behavior is normal    Nursing note and vitals reviewed        Vital Signs  ED Triage Vitals [03/20/20 1915]   Temperature Pulse Respirations Blood Pressure SpO2   98 3 °F (36 8 °C) 81 20 156/83 98 %      Temp src Heart Rate Source Patient Position - Orthostatic VS BP Location FiO2 (%)   -- -- -- -- --      Pain Score       --           Vitals:    03/20/20 1915   BP: 156/83   Pulse: 81         Visual Acuity      ED Medications  Medications - No data to display    Diagnostic Studies  Results Reviewed     None                 No orders to display              Procedures  Procedures         ED Course  ED Course as of Mar 20 2200   Fri Mar 20, 2020   2102 With the flu and RSV being negative, covid remains viable option  He was sent home, core T himself until he gets the results of at least 5 days  Symptomatic use of cough syrup and a mini dose inhaler  MDM      Disposition  Final diagnoses:   None     ED Disposition     None      Follow-up Information    None         Patient's Medications   Discharge Prescriptions    No medications on file     No discharge procedures on file      PDMP Review     None          ED Provider  Electronically Signed by           Bev Shirley,   03/20/20 [address] [facility],   03/20/20 1572

## 2020-03-21 NOTE — DISCHARGE INSTRUCTIONS
Quarantine herself until your covid-19 results are back  No work until he received those results  You should be cleared before returning to work, consider urgent care for that

## 2020-03-27 LAB — SARS-COV-2 RNA SPEC QL NAA+PROBE: NOT DETECTED

## 2020-05-18 ENCOUNTER — HOSPITAL ENCOUNTER (EMERGENCY)
Facility: HOSPITAL | Age: 45
Discharge: HOME | End: 2020-05-18
Attending: EMERGENCY MEDICINE | Admitting: EMERGENCY MEDICINE

## 2020-05-18 VITALS
DIASTOLIC BLOOD PRESSURE: 89 MMHG | OXYGEN SATURATION: 97 % | HEART RATE: 81 BPM | RESPIRATION RATE: 16 BRPM | BODY MASS INDEX: 35.21 KG/M2 | WEIGHT: 260 LBS | HEIGHT: 72 IN | TEMPERATURE: 98.3 F | SYSTOLIC BLOOD PRESSURE: 141 MMHG

## 2020-05-18 DIAGNOSIS — S05.00XA CORNEAL ABRASION: Primary | ICD-10-CM

## 2020-05-18 PROCEDURE — 99283 EMERGENCY DEPT VISIT LOW MDM: CPT

## 2020-05-18 PROCEDURE — 99284 EMERGENCY DEPT VISIT MOD MDM: CPT | Performed by: EMERGENCY MEDICINE

## 2020-05-18 RX ORDER — IBUPROFEN 400 MG/1
400 TABLET ORAL ONCE
Status: COMPLETED | OUTPATIENT
Start: 2020-05-18 | End: 2020-05-18

## 2020-05-18 RX ORDER — ERYTHROMYCIN 5 MG/G
0.5 OINTMENT OPHTHALMIC EVERY 6 HOURS SCHEDULED
Qty: 20 G | Refills: 0 | Status: SHIPPED | OUTPATIENT
Start: 2020-05-18 | End: 2020-05-23

## 2020-05-18 RX ORDER — ACETAMINOPHEN 325 MG/1
650 TABLET ORAL ONCE
Status: COMPLETED | OUTPATIENT
Start: 2020-05-18 | End: 2020-05-18

## 2020-05-18 RX ORDER — TETRACAINE HYDROCHLORIDE 5 MG/ML
1 SOLUTION OPHTHALMIC ONCE
Status: COMPLETED | OUTPATIENT
Start: 2020-05-18 | End: 2020-05-18

## 2020-05-18 RX ADMIN — FLUORESCEIN SODIUM 1 STRIP: 1 STRIP OPHTHALMIC at 20:42

## 2020-05-18 RX ADMIN — IBUPROFEN 400 MG: 400 TABLET ORAL at 20:42

## 2020-05-18 RX ADMIN — TETRACAINE HYDROCHLORIDE 1 DROP: 5 SOLUTION OPHTHALMIC at 20:42

## 2020-05-18 RX ADMIN — ACETAMINOPHEN 650 MG: 325 TABLET ORAL at 20:42

## 2020-08-23 ENCOUNTER — HOSPITAL ENCOUNTER (EMERGENCY)
Facility: HOSPITAL | Age: 45
Discharge: HOME | End: 2020-08-23
Attending: EMERGENCY MEDICINE | Admitting: EMERGENCY MEDICINE

## 2020-08-23 ENCOUNTER — APPOINTMENT (EMERGENCY)
Dept: RADIOLOGY | Facility: HOSPITAL | Age: 45
End: 2020-08-23

## 2020-08-23 VITALS
SYSTOLIC BLOOD PRESSURE: 153 MMHG | HEART RATE: 84 BPM | OXYGEN SATURATION: 98 % | TEMPERATURE: 97.8 F | RESPIRATION RATE: 18 BRPM | HEIGHT: 72 IN | WEIGHT: 260 LBS | DIASTOLIC BLOOD PRESSURE: 93 MMHG | BODY MASS INDEX: 35.21 KG/M2

## 2020-08-23 DIAGNOSIS — L40.9 PSORIASIS: Primary | ICD-10-CM

## 2020-08-23 PROCEDURE — 99283 EMERGENCY DEPT VISIT LOW MDM: CPT

## 2020-08-23 PROCEDURE — 99282 EMERGENCY DEPT VISIT SF MDM: CPT | Performed by: EMERGENCY MEDICINE

## 2020-08-23 RX ORDER — GABAPENTIN 300 MG/1
300 CAPSULE ORAL 3 TIMES DAILY
Qty: 20 CAPSULE | Refills: 0 | Status: ON HOLD | OUTPATIENT
Start: 2020-08-23 | End: 2020-10-05 | Stop reason: CLARIF

## 2020-08-23 RX ORDER — GABAPENTIN 300 MG/1
300 CAPSULE ORAL ONCE
Status: COMPLETED | OUTPATIENT
Start: 2020-08-23 | End: 2020-08-23

## 2020-08-23 RX ORDER — BETAMETHASONE DIPROPIONATE 0.05 %
OINTMENT (GRAM) TOPICAL 2 TIMES DAILY
Qty: 30 G | Refills: 0 | Status: ON HOLD | OUTPATIENT
Start: 2020-08-23 | End: 2020-10-05 | Stop reason: CLARIF

## 2020-08-23 RX ORDER — IBUPROFEN 200 MG
600 TABLET ORAL EVERY 6 HOURS PRN
Status: ON HOLD | COMMUNITY
End: 2020-10-05

## 2020-08-23 RX ADMIN — GABAPENTIN 300 MG: 300 CAPSULE ORAL at 18:03

## 2020-08-23 NOTE — ED PROVIDER NOTES
History  Chief Complaint   Patient presents with    Rash     ongoing for "a long time" pt states he was told by the dermatologist it is psoriasis and was spending large amounts of money to suppress the rash     Patient is a 28-year-old male with history of psoriasis presenting with complaint of bilateral upper and lower extremity rash  Patient states he has had this rash for several years and has been evaluated by dermatology and told it was psoriasis  He has been on treatment in the past however he lost his job several months ago and was unable to afford treatment  Describes itchy rash with plaques over his bilateral ankles, and elbows and wrists  He states after itching they then hurt  He has tried poor rubbing alcohol and brake  on at home  Neither the rubbing alcohol, nor the brake  helped his painful rash  Rash   Location:  Shoulder/arm and leg  Shoulder/arm rash location:  L elbow and R elbow  Leg rash location:  L ankle and R ankle  Severity:  Mild  Onset quality:  Gradual  Duration:  3 months  Timing:  Constant  Progression:  Worsening  Chronicity:  Chronic  Context: not eggs, not nuts and not sun exposure    Relieved by:  Nothing  Worsened by:  Nothing  Ineffective treatments: brake , rubbing alcohol  Associated symptoms: no fatigue, no fever, no joint pain, no nausea, no sore throat, not vomiting and not wheezing        Prior to Admission Medications   Prescriptions Last Dose Informant Patient Reported? Taking?   ibuprofen (MOTRIN) 200 mg tablet   Yes Yes   Sig: Take 600 mg by mouth every 6 (six) hours as needed      Facility-Administered Medications: None       Past Medical History:   Diagnosis Date    Gout        Past Surgical History:   Procedure Laterality Date    ARTHROSCOPY KNEE      FASCIOTOMY      LEG SURGERY      OSTEOTOMY      TONSILLECTOMY      VASECTOMY         History reviewed  No pertinent family history    I have reviewed and agree with the history as documented  E-Cigarette/Vaping    E-Cigarette Use Never User      E-Cigarette/Vaping Substances     Social History     Tobacco Use    Smoking status: Former Smoker    Smokeless tobacco: Current User     Types: Chew   Substance Use Topics    Alcohol use: Not Currently    Drug use: Never       Review of Systems   Constitutional: Negative for fatigue and fever  HENT: Negative for rhinorrhea and sore throat  Eyes: Negative for pain and itching  Respiratory: Negative for cough and wheezing  Cardiovascular: Negative for chest pain and palpitations  Gastrointestinal: Negative for abdominal distention, nausea and vomiting  Musculoskeletal: Negative for arthralgias and back pain  Skin: Positive for rash  Negative for pallor  Neurological: Negative for dizziness and numbness  Hematological: Negative for adenopathy  Does not bruise/bleed easily  Psychiatric/Behavioral: Negative for confusion and self-injury  Physical Exam  Physical Exam  Vitals signs and nursing note reviewed  Constitutional:       Appearance: He is well-developed  HENT:      Head: Normocephalic and atraumatic  Eyes:      Conjunctiva/sclera: Conjunctivae normal       Pupils: Pupils are equal, round, and reactive to light  Neck:      Musculoskeletal: Normal range of motion and neck supple  Trachea: No tracheal deviation  Cardiovascular:      Rate and Rhythm: Normal rate and regular rhythm  Heart sounds: Normal heart sounds  No murmur  Pulmonary:      Effort: Pulmonary effort is normal  No respiratory distress  Breath sounds: Normal breath sounds  No wheezing or rales  Abdominal:      General: Bowel sounds are normal  There is no distension  Palpations: Abdomen is soft  Tenderness: There is no abdominal tenderness  Musculoskeletal:         General: No deformity  Skin:     General: Skin is dry  Capillary Refill: Capillary refill takes less than 2 seconds        Comments: Dry, scaled areas on ankles, wrists, with excoriations  No evidence of infection   Neurological:      Mental Status: He is alert and oriented to person, place, and time  Sensory: No sensory deficit  Psychiatric:         Judgment: Judgment normal          Vital Signs  ED Triage Vitals [08/23/20 1715]   Temperature Pulse Respirations Blood Pressure SpO2   97 8 °F (36 6 °C) 84 18 153/93 98 %      Temp Source Heart Rate Source Patient Position - Orthostatic VS BP Location FiO2 (%)   Temporal Monitor Sitting Left arm --      Pain Score       7           Vitals:    08/23/20 1715   BP: 153/93   Pulse: 84   Patient Position - Orthostatic VS: Sitting         Visual Acuity      ED Medications  Medications - No data to display    Diagnostic Studies  Results Reviewed     None                 No orders to display              Procedures  Procedures         ED Course       US AUDIT      Most Recent Value   Initial Alcohol Screen: US AUDIT-C    1  How often do you have a drink containing alcohol?  0 Filed at: 08/23/2020 1717   2  How many drinks containing alcohol do you have on a typical day you are drinking? 0 Filed at: 08/23/2020 1717   3a  Male UNDER 65: How often do you have five or more drinks on one occasion? 0 Filed at: 08/23/2020 1717   3b  FEMALE Any Age, or MALE 65+: How often do you have 4 or more drinks on one occassion? 0 Filed at: 08/23/2020 1717   Audit-C Score  0 Filed at: 08/23/2020 1717                  TAMI/DAST-10      Most Recent Value   How many times in the past year have you    Used an illegal drug or used a prescription medication for non-medical reasons? Never Filed at: 08/23/2020 1720                                MDM  Number of Diagnoses or Management Options  Psoriasis: new and requires workup  Diagnosis management comments: Patient is a 75-year-old male presenting with psoriasis, uncontrolled, not on any medications  No evidence of infection      Will start a topical steroid is a believe this is the most affordable option for the patient  Also requesting something for pain after irritating it, will give gabapentin as he states this helped in the past   I counseled patient extensively not to use brake  on his skin or other products against their intended use  Patient will follow up with dermatology  Amount and/or Complexity of Data Reviewed  Review and summarize past medical records: yes    Risk of Complications, Morbidity, and/or Mortality  Presenting problems: low  Diagnostic procedures: minimal  Management options: low          Disposition  Final diagnoses:   Psoriasis     Time reflects when diagnosis was documented in both MDM as applicable and the Disposition within this note     Time User Action Codes Description Comment    8/23/2020  5:52 PM Yaya Hernandez Add [L40 9] Psoriasis       ED Disposition     ED Disposition Condition Date/Time Comment    Discharge Stable Sun Aug 23, 2020  5:52 PM Marlee Pratt discharge to home/self care  Follow-up Information     Follow up With Specialties Details Why Contact Info     Dermatology Dermatology Schedule an appointment as soon as possible for a visit   [facility] [address]  [address] [facility] [address], DO Family Medicine Schedule an appointment as soon as possible for a visit   [address] [place] [address]  [phone]            Patient's Medications   Discharge Prescriptions    No medications on file     No discharge procedures on file      PDMP Review     None          ED Provider  Electronically Signed by           Cheri Nicholas DO  08/24/20 7285

## 2020-08-23 NOTE — DISCHARGE INSTRUCTIONS
You were seen for a flare up of your psorasis  Use the steroid ointment as prescribed and follow up with dermatology for further care   Return if you develop signs of infection such as redness, fever, chills, nausea, vomiting

## 2020-10-04 ENCOUNTER — HOSPITAL ENCOUNTER (EMERGENCY)
Facility: HOSPITAL | Age: 45
Discharge: HOME | End: 2020-10-05
Attending: INTERNAL MEDICINE | Admitting: INTERNAL MEDICINE

## 2020-10-04 ENCOUNTER — APPOINTMENT (EMERGENCY)
Dept: CT IMAGING | Facility: HOSPITAL | Age: 45
End: 2020-10-04

## 2020-10-04 DIAGNOSIS — R10.9 RECURRENT ABDOMINAL PAIN: Primary | ICD-10-CM

## 2020-10-04 LAB
ALBUMIN SERPL BCP-MCNC: 4.5 G/DL (ref 3.5–5.7)
ALP SERPL-CCNC: 55 U/L (ref 40–150)
ALT SERPL W P-5'-P-CCNC: 18 U/L (ref 7–52)
ANION GAP SERPL CALCULATED.3IONS-SCNC: 11 MMOL/L (ref 4–13)
AST SERPL W P-5'-P-CCNC: 16 U/L (ref 13–39)
ATRIAL RATE: 82 BPM
BACTERIA UR QL AUTO: NORMAL /HPF
BASOPHILS # BLD AUTO: 0.1 THOUSANDS/ΜL (ref 0–0.1)
BASOPHILS NFR BLD AUTO: 1 % (ref 0–2)
BILIRUB SERPL-MCNC: 0.3 MG/DL (ref 0.2–1)
BILIRUB UR QL STRIP: NEGATIVE
BUN SERPL-MCNC: 13 MG/DL (ref 7–25)
CALCIUM SERPL-MCNC: 9.3 MG/DL (ref 8.6–10.5)
CHLORIDE SERPL-SCNC: 102 MMOL/L (ref 98–107)
CLARITY UR: CLEAR
CO2 SERPL-SCNC: 25 MMOL/L (ref 21–31)
COLOR UR: YELLOW
CREAT SERPL-MCNC: 1.17 MG/DL (ref 0.7–1.3)
EOSINOPHIL # BLD AUTO: 0.4 THOUSAND/ΜL (ref 0–0.61)
EOSINOPHIL NFR BLD AUTO: 5 % (ref 0–5)
ERYTHROCYTE [DISTWIDTH] IN BLOOD BY AUTOMATED COUNT: 13.5 % (ref 11.5–14.5)
GFR SERPL CREATININE-BSD FRML MDRD: 74 ML/MIN/1.73SQ M
GLUCOSE SERPL-MCNC: 127 MG/DL (ref 65–99)
GLUCOSE UR STRIP-MCNC: NEGATIVE MG/DL
HCT VFR BLD AUTO: 44.3 % (ref 42–47)
HGB BLD-MCNC: 14.4 G/DL (ref 14–18)
HGB UR QL STRIP.AUTO: NEGATIVE
KETONES UR STRIP-MCNC: NEGATIVE MG/DL
LEUKOCYTE ESTERASE UR QL STRIP: ABNORMAL
LIPASE SERPL-CCNC: 14 U/L (ref 11–82)
LYMPHOCYTES # BLD AUTO: 2.2 THOUSANDS/ΜL (ref 0.6–4.47)
LYMPHOCYTES NFR BLD AUTO: 24 % (ref 21–51)
MCH RBC QN AUTO: 28.3 PG (ref 26–34)
MCHC RBC AUTO-ENTMCNC: 32.4 G/DL (ref 31–37)
MCV RBC AUTO: 88 FL (ref 81–99)
MONOCYTES # BLD AUTO: 0.6 THOUSAND/ΜL (ref 0.17–1.22)
MONOCYTES NFR BLD AUTO: 6 % (ref 2–12)
NEUTROPHILS # BLD AUTO: 5.8 THOUSANDS/ΜL (ref 1.4–6.5)
NEUTS SEG NFR BLD AUTO: 64 % (ref 42–75)
NITRITE UR QL STRIP: NEGATIVE
NON-SQ EPI CELLS URNS QL MICRO: NORMAL /HPF
P AXIS: 47 DEGREES
PH UR STRIP.AUTO: 8 [PH]
PLATELET # BLD AUTO: 319 THOUSANDS/UL (ref 149–390)
PMV BLD AUTO: 8.2 FL (ref 8.6–11.7)
POTASSIUM SERPL-SCNC: 3.7 MMOL/L (ref 3.5–5.5)
PR INTERVAL: 150 MS
PROT SERPL-MCNC: 7.1 G/DL (ref 6.4–8.9)
PROT UR STRIP-MCNC: NEGATIVE MG/DL
QRS AXIS: 17 DEGREES
QRSD INTERVAL: 100 MS
QT INTERVAL: 382 MS
QTC INTERVAL: 446 MS
RBC # BLD AUTO: 5.06 MILLION/UL (ref 4.3–5.9)
RBC #/AREA URNS AUTO: NORMAL /HPF
SODIUM SERPL-SCNC: 138 MMOL/L (ref 134–143)
SP GR UR STRIP.AUTO: 1.01 (ref 1–1.03)
T WAVE AXIS: 54 DEGREES
TROPONIN I SERPL-MCNC: <0.03 NG/ML
UROBILINOGEN UR QL STRIP.AUTO: 0.2 E.U./DL
VENTRICULAR RATE: 82 BPM
WBC # BLD AUTO: 9 THOUSAND/UL (ref 4.8–10.8)
WBC #/AREA URNS AUTO: NORMAL /HPF

## 2020-10-04 PROCEDURE — 80053 COMPREHEN METABOLIC PANEL: CPT | Performed by: INTERNAL MEDICINE

## 2020-10-04 PROCEDURE — 99282 EMERGENCY DEPT VISIT SF MDM: CPT | Performed by: INTERNAL MEDICINE

## 2020-10-04 PROCEDURE — 93010 ELECTROCARDIOGRAM REPORT: CPT | Performed by: INTERNAL MEDICINE

## 2020-10-04 PROCEDURE — 96374 THER/PROPH/DIAG INJ IV PUSH: CPT

## 2020-10-04 PROCEDURE — 93005 ELECTROCARDIOGRAM TRACING: CPT

## 2020-10-04 PROCEDURE — 99285 EMERGENCY DEPT VISIT HI MDM: CPT

## 2020-10-04 PROCEDURE — 36415 COLL VENOUS BLD VENIPUNCTURE: CPT | Performed by: INTERNAL MEDICINE

## 2020-10-04 PROCEDURE — 85025 COMPLETE CBC W/AUTO DIFF WBC: CPT | Performed by: INTERNAL MEDICINE

## 2020-10-04 PROCEDURE — 74177 CT ABD & PELVIS W/CONTRAST: CPT

## 2020-10-04 PROCEDURE — 81001 URINALYSIS AUTO W/SCOPE: CPT | Performed by: INTERNAL MEDICINE

## 2020-10-04 PROCEDURE — 84484 ASSAY OF TROPONIN QUANT: CPT | Performed by: INTERNAL MEDICINE

## 2020-10-04 PROCEDURE — G1004 CDSM NDSC: HCPCS

## 2020-10-04 PROCEDURE — 83690 ASSAY OF LIPASE: CPT | Performed by: INTERNAL MEDICINE

## 2020-10-04 PROCEDURE — 74176 CT ABD & PELVIS W/O CONTRAST: CPT

## 2020-10-04 PROCEDURE — 96375 TX/PRO/DX INJ NEW DRUG ADDON: CPT

## 2020-10-04 PROCEDURE — 96361 HYDRATE IV INFUSION ADD-ON: CPT

## 2020-10-04 RX ORDER — HYDROMORPHONE HCL/PF 1 MG/ML
1 SYRINGE (ML) INJECTION ONCE
Status: COMPLETED | OUTPATIENT
Start: 2020-10-05 | End: 2020-10-05

## 2020-10-04 RX ORDER — ONDANSETRON 2 MG/ML
4 INJECTION INTRAMUSCULAR; INTRAVENOUS ONCE
Status: COMPLETED | OUTPATIENT
Start: 2020-10-04 | End: 2020-10-04

## 2020-10-04 RX ORDER — KETOROLAC TROMETHAMINE 30 MG/ML
15 INJECTION, SOLUTION INTRAMUSCULAR; INTRAVENOUS ONCE
Status: COMPLETED | OUTPATIENT
Start: 2020-10-04 | End: 2020-10-04

## 2020-10-04 RX ORDER — HYDROMORPHONE HCL/PF 1 MG/ML
1 SYRINGE (ML) INJECTION ONCE
Status: COMPLETED | OUTPATIENT
Start: 2020-10-04 | End: 2020-10-04

## 2020-10-04 RX ORDER — SODIUM CHLORIDE 9 MG/ML
125 INJECTION, SOLUTION INTRAVENOUS CONTINUOUS
Status: DISCONTINUED | OUTPATIENT
Start: 2020-10-04 | End: 2020-10-05 | Stop reason: HOSPADM

## 2020-10-04 RX ADMIN — IOHEXOL 100 ML: 350 INJECTION, SOLUTION INTRAVENOUS at 23:42

## 2020-10-04 RX ADMIN — HYDROMORPHONE HYDROCHLORIDE 1 MG: 1 INJECTION, SOLUTION INTRAMUSCULAR; INTRAVENOUS; SUBCUTANEOUS at 21:43

## 2020-10-04 RX ADMIN — SODIUM CHLORIDE 125 ML/HR: 0.9 INJECTION, SOLUTION INTRAVENOUS at 20:42

## 2020-10-04 RX ADMIN — KETOROLAC TROMETHAMINE 15 MG: 30 INJECTION, SOLUTION INTRAMUSCULAR at 20:42

## 2020-10-04 RX ADMIN — ONDANSETRON 4 MG: 2 INJECTION INTRAMUSCULAR; INTRAVENOUS at 20:42

## 2020-10-05 ENCOUNTER — HOSPITAL ENCOUNTER (OUTPATIENT)
Facility: HOSPITAL | Age: 45
Setting detail: OBSERVATION
Discharge: HOME | End: 2020-10-06
Attending: EMERGENCY MEDICINE | Admitting: INTERNAL MEDICINE

## 2020-10-05 VITALS
WEIGHT: 249.12 LBS | HEART RATE: 80 BPM | SYSTOLIC BLOOD PRESSURE: 128 MMHG | TEMPERATURE: 98.6 F | OXYGEN SATURATION: 96 % | DIASTOLIC BLOOD PRESSURE: 85 MMHG | BODY MASS INDEX: 33.79 KG/M2 | RESPIRATION RATE: 22 BRPM

## 2020-10-05 DIAGNOSIS — R10.9 ABDOMINAL PAIN: Primary | ICD-10-CM

## 2020-10-05 PROBLEM — N18.30 STAGE 3 CHRONIC KIDNEY DISEASE (HCC): Status: ACTIVE | Noted: 2020-10-05

## 2020-10-05 LAB
ALBUMIN SERPL BCP-MCNC: 3.6 G/DL (ref 3.5–5)
ALP SERPL-CCNC: 65 U/L (ref 46–116)
ALT SERPL W P-5'-P-CCNC: 28 U/L (ref 12–78)
AMPHETAMINES SERPL QL SCN: POSITIVE
ANION GAP SERPL CALCULATED.3IONS-SCNC: 6 MMOL/L (ref 4–13)
AST SERPL W P-5'-P-CCNC: 12 U/L (ref 5–45)
BARBITURATES UR QL: NEGATIVE
BASOPHILS # BLD AUTO: 0.08 THOUSANDS/ΜL (ref 0–0.1)
BASOPHILS NFR BLD AUTO: 1 % (ref 0–1)
BENZODIAZ UR QL: NEGATIVE
BILIRUB DIRECT SERPL-MCNC: 0.1 MG/DL (ref 0–0.2)
BILIRUB SERPL-MCNC: 0.33 MG/DL (ref 0.2–1)
BUN SERPL-MCNC: 12 MG/DL (ref 5–25)
CALCIUM SERPL-MCNC: 8.6 MG/DL (ref 8.3–10.1)
CHLORIDE SERPL-SCNC: 104 MMOL/L (ref 100–108)
CO2 SERPL-SCNC: 26 MMOL/L (ref 21–32)
COCAINE UR QL: NEGATIVE
CREAT SERPL-MCNC: 1.14 MG/DL (ref 0.6–1.3)
EOSINOPHIL # BLD AUTO: 0.42 THOUSAND/ΜL (ref 0–0.61)
EOSINOPHIL NFR BLD AUTO: 7 % (ref 0–6)
ERYTHROCYTE [DISTWIDTH] IN BLOOD BY AUTOMATED COUNT: 12.9 % (ref 11.6–15.1)
GFR SERPL CREATININE-BSD FRML MDRD: 77 ML/MIN/1.73SQ M
GLUCOSE SERPL-MCNC: 96 MG/DL (ref 65–140)
HCT VFR BLD AUTO: 42.3 % (ref 36.5–49.3)
HGB BLD-MCNC: 13.4 G/DL (ref 12–17)
IMM GRANULOCYTES # BLD AUTO: 0.03 THOUSAND/UL (ref 0–0.2)
IMM GRANULOCYTES NFR BLD AUTO: 1 % (ref 0–2)
LIPASE SERPL-CCNC: 82 U/L (ref 73–393)
LYMPHOCYTES # BLD AUTO: 1.82 THOUSANDS/ΜL (ref 0.6–4.47)
LYMPHOCYTES NFR BLD AUTO: 28 % (ref 14–44)
MAGNESIUM SERPL-MCNC: 1.9 MG/DL (ref 1.6–2.6)
MCH RBC QN AUTO: 28.2 PG (ref 26.8–34.3)
MCHC RBC AUTO-ENTMCNC: 31.7 G/DL (ref 31.4–37.4)
MCV RBC AUTO: 89 FL (ref 82–98)
METHADONE UR QL: NEGATIVE
MONOCYTES # BLD AUTO: 0.6 THOUSAND/ΜL (ref 0.17–1.22)
MONOCYTES NFR BLD AUTO: 9 % (ref 4–12)
NEUTROPHILS # BLD AUTO: 3.48 THOUSANDS/ΜL (ref 1.85–7.62)
NEUTS SEG NFR BLD AUTO: 54 % (ref 43–75)
NRBC BLD AUTO-RTO: 0 /100 WBCS
OPIATES UR QL SCN: NEGATIVE
OXYCODONE+OXYMORPHONE UR QL SCN: NEGATIVE
PCP UR QL: NEGATIVE
PLATELET # BLD AUTO: 302 THOUSANDS/UL (ref 149–390)
PMV BLD AUTO: 9.6 FL (ref 8.9–12.7)
POTASSIUM SERPL-SCNC: 4.3 MMOL/L (ref 3.5–5.3)
PROT SERPL-MCNC: 6.8 G/DL (ref 6.4–8.2)
RBC # BLD AUTO: 4.75 MILLION/UL (ref 3.88–5.62)
SODIUM SERPL-SCNC: 136 MMOL/L (ref 136–145)
THC UR QL: NEGATIVE
TROPONIN I SERPL-MCNC: <0.02 NG/ML
WBC # BLD AUTO: 6.43 THOUSAND/UL (ref 4.31–10.16)

## 2020-10-05 PROCEDURE — 84484 ASSAY OF TROPONIN QUANT: CPT | Performed by: EMERGENCY MEDICINE

## 2020-10-05 PROCEDURE — 80076 HEPATIC FUNCTION PANEL: CPT | Performed by: EMERGENCY MEDICINE

## 2020-10-05 PROCEDURE — 83735 ASSAY OF MAGNESIUM: CPT | Performed by: EMERGENCY MEDICINE

## 2020-10-05 PROCEDURE — 80048 BASIC METABOLIC PNL TOTAL CA: CPT | Performed by: EMERGENCY MEDICINE

## 2020-10-05 PROCEDURE — 99285 EMERGENCY DEPT VISIT HI MDM: CPT | Performed by: EMERGENCY MEDICINE

## 2020-10-05 PROCEDURE — 99220 PR INITIAL OBSERVATION CARE/DAY 70 MINUTES: CPT | Performed by: INTERNAL MEDICINE

## 2020-10-05 PROCEDURE — 80307 DRUG TEST PRSMV CHEM ANLYZR: CPT | Performed by: PHYSICIAN ASSISTANT

## 2020-10-05 PROCEDURE — NC001 PR NO CHARGE: Performed by: EMERGENCY MEDICINE

## 2020-10-05 PROCEDURE — 99284 EMERGENCY DEPT VISIT MOD MDM: CPT

## 2020-10-05 PROCEDURE — 96375 TX/PRO/DX INJ NEW DRUG ADDON: CPT

## 2020-10-05 PROCEDURE — 85025 COMPLETE CBC W/AUTO DIFF WBC: CPT | Performed by: EMERGENCY MEDICINE

## 2020-10-05 PROCEDURE — 96361 HYDRATE IV INFUSION ADD-ON: CPT

## 2020-10-05 PROCEDURE — 83690 ASSAY OF LIPASE: CPT | Performed by: EMERGENCY MEDICINE

## 2020-10-05 PROCEDURE — 93005 ELECTROCARDIOGRAM TRACING: CPT

## 2020-10-05 PROCEDURE — 36415 COLL VENOUS BLD VENIPUNCTURE: CPT | Performed by: EMERGENCY MEDICINE

## 2020-10-05 PROCEDURE — 96374 THER/PROPH/DIAG INJ IV PUSH: CPT

## 2020-10-05 PROCEDURE — 96376 TX/PRO/DX INJ SAME DRUG ADON: CPT

## 2020-10-05 RX ORDER — METOCLOPRAMIDE HYDROCHLORIDE 5 MG/ML
10 INJECTION INTRAMUSCULAR; INTRAVENOUS ONCE
Status: COMPLETED | OUTPATIENT
Start: 2020-10-05 | End: 2020-10-05

## 2020-10-05 RX ORDER — ONDANSETRON 2 MG/ML
4 INJECTION INTRAMUSCULAR; INTRAVENOUS EVERY 6 HOURS PRN
Status: DISCONTINUED | OUTPATIENT
Start: 2020-10-05 | End: 2020-10-06 | Stop reason: HOSPADM

## 2020-10-05 RX ORDER — DICYCLOMINE HCL 20 MG
20 TABLET ORAL ONCE
Status: COMPLETED | OUTPATIENT
Start: 2020-10-05 | End: 2020-10-05

## 2020-10-05 RX ORDER — SODIUM CHLORIDE 9 MG/ML
50 INJECTION, SOLUTION INTRAVENOUS CONTINUOUS
Status: DISCONTINUED | OUTPATIENT
Start: 2020-10-05 | End: 2020-10-06 | Stop reason: HOSPADM

## 2020-10-05 RX ORDER — ACETAMINOPHEN 325 MG/1
650 TABLET ORAL EVERY 6 HOURS PRN
Status: DISCONTINUED | OUTPATIENT
Start: 2020-10-05 | End: 2020-10-06 | Stop reason: HOSPADM

## 2020-10-05 RX ORDER — FAMOTIDINE 20 MG/1
20 TABLET, FILM COATED ORAL 2 TIMES DAILY
Qty: 30 TABLET | Refills: 0 | Status: SHIPPED | OUTPATIENT
Start: 2020-10-05 | End: 2020-10-06 | Stop reason: HOSPADM

## 2020-10-05 RX ORDER — KETOROLAC TROMETHAMINE 30 MG/ML
15 INJECTION, SOLUTION INTRAMUSCULAR; INTRAVENOUS ONCE
Status: COMPLETED | OUTPATIENT
Start: 2020-10-05 | End: 2020-10-05

## 2020-10-05 RX ORDER — LIDOCAINE HYDROCHLORIDE 20 MG/ML
15 SOLUTION OROPHARYNGEAL ONCE
Status: COMPLETED | OUTPATIENT
Start: 2020-10-05 | End: 2020-10-05

## 2020-10-05 RX ORDER — ONDANSETRON 2 MG/ML
4 INJECTION INTRAMUSCULAR; INTRAVENOUS ONCE
Status: COMPLETED | OUTPATIENT
Start: 2020-10-05 | End: 2020-10-05

## 2020-10-05 RX ORDER — HYDROMORPHONE HCL/PF 1 MG/ML
0.5 SYRINGE (ML) INJECTION ONCE
Status: COMPLETED | OUTPATIENT
Start: 2020-10-05 | End: 2020-10-05

## 2020-10-05 RX ADMIN — HYDROMORPHONE HYDROCHLORIDE 0.5 MG: 1 INJECTION, SOLUTION INTRAMUSCULAR; INTRAVENOUS; SUBCUTANEOUS at 19:19

## 2020-10-05 RX ADMIN — METOCLOPRAMIDE 10 MG: 5 INJECTION, SOLUTION INTRAMUSCULAR; INTRAVENOUS at 20:47

## 2020-10-05 RX ADMIN — LIDOCAINE HYDROCHLORIDE 15 ML: 20 SOLUTION ORAL; TOPICAL at 20:47

## 2020-10-05 RX ADMIN — SODIUM CHLORIDE 1000 ML: 0.9 INJECTION, SOLUTION INTRAVENOUS at 18:15

## 2020-10-05 RX ADMIN — FAMOTIDINE 20 MG: 10 INJECTION, SOLUTION INTRAVENOUS at 20:47

## 2020-10-05 RX ADMIN — HYOSCYAMINE SULFATE 0.25 MG: 0.12 TABLET SUBLINGUAL at 18:14

## 2020-10-05 RX ADMIN — ONDANSETRON 4 MG: 2 INJECTION INTRAMUSCULAR; INTRAVENOUS at 18:16

## 2020-10-05 RX ADMIN — KETOROLAC TROMETHAMINE 15 MG: 30 INJECTION, SOLUTION INTRAMUSCULAR at 23:30

## 2020-10-05 RX ADMIN — DICYCLOMINE HYDROCHLORIDE 20 MG: 20 TABLET ORAL at 18:40

## 2020-10-05 RX ADMIN — SODIUM CHLORIDE 50 ML/HR: 0.9 INJECTION, SOLUTION INTRAVENOUS at 20:47

## 2020-10-05 RX ADMIN — HYDROMORPHONE HYDROCHLORIDE 1 MG: 1 INJECTION, SOLUTION INTRAMUSCULAR; INTRAVENOUS; SUBCUTANEOUS at 00:00

## 2020-10-06 VITALS
HEART RATE: 47 BPM | DIASTOLIC BLOOD PRESSURE: 58 MMHG | OXYGEN SATURATION: 96 % | BODY MASS INDEX: 34.35 KG/M2 | WEIGHT: 253.31 LBS | SYSTOLIC BLOOD PRESSURE: 106 MMHG | TEMPERATURE: 97.9 F | RESPIRATION RATE: 18 BRPM

## 2020-10-06 LAB
ALBUMIN SERPL BCP-MCNC: 3 G/DL (ref 3.5–5)
ALP SERPL-CCNC: 53 U/L (ref 46–116)
ALT SERPL W P-5'-P-CCNC: 22 U/L (ref 12–78)
ANION GAP SERPL CALCULATED.3IONS-SCNC: 7 MMOL/L (ref 4–13)
AST SERPL W P-5'-P-CCNC: 10 U/L (ref 5–45)
ATRIAL RATE: 66 BPM
BILIRUB SERPL-MCNC: 0.57 MG/DL (ref 0.2–1)
BUN SERPL-MCNC: 12 MG/DL (ref 5–25)
CALCIUM ALBUM COR SERPL-MCNC: 8.8 MG/DL (ref 8.3–10.1)
CALCIUM SERPL-MCNC: 8 MG/DL (ref 8.3–10.1)
CHLORIDE SERPL-SCNC: 106 MMOL/L (ref 100–108)
CO2 SERPL-SCNC: 26 MMOL/L (ref 21–32)
CREAT SERPL-MCNC: 1.2 MG/DL (ref 0.6–1.3)
ERYTHROCYTE [DISTWIDTH] IN BLOOD BY AUTOMATED COUNT: 12.8 % (ref 11.6–15.1)
GFR SERPL CREATININE-BSD FRML MDRD: 72 ML/MIN/1.73SQ M
GLUCOSE P FAST SERPL-MCNC: 91 MG/DL (ref 65–99)
GLUCOSE SERPL-MCNC: 91 MG/DL (ref 65–140)
HCT VFR BLD AUTO: 39.7 % (ref 36.5–49.3)
HGB BLD-MCNC: 12.4 G/DL (ref 12–17)
MCH RBC QN AUTO: 28.2 PG (ref 26.8–34.3)
MCHC RBC AUTO-ENTMCNC: 31.2 G/DL (ref 31.4–37.4)
MCV RBC AUTO: 90 FL (ref 82–98)
P AXIS: 44 DEGREES
PLATELET # BLD AUTO: 282 THOUSANDS/UL (ref 149–390)
PMV BLD AUTO: 9.8 FL (ref 8.9–12.7)
POTASSIUM SERPL-SCNC: 4.2 MMOL/L (ref 3.5–5.3)
PR INTERVAL: 146 MS
PROT SERPL-MCNC: 5.9 G/DL (ref 6.4–8.2)
QRS AXIS: 31 DEGREES
QRSD INTERVAL: 94 MS
QT INTERVAL: 382 MS
QTC INTERVAL: 400 MS
RBC # BLD AUTO: 4.4 MILLION/UL (ref 3.88–5.62)
SODIUM SERPL-SCNC: 139 MMOL/L (ref 136–145)
T WAVE AXIS: 50 DEGREES
VENTRICULAR RATE: 66 BPM
WBC # BLD AUTO: 6.14 THOUSAND/UL (ref 4.31–10.16)

## 2020-10-06 PROCEDURE — 85027 COMPLETE CBC AUTOMATED: CPT | Performed by: PHYSICIAN ASSISTANT

## 2020-10-06 PROCEDURE — 99244 OFF/OP CNSLTJ NEW/EST MOD 40: CPT | Performed by: INTERNAL MEDICINE

## 2020-10-06 PROCEDURE — 99239 HOSP IP/OBS DSCHRG MGMT >30: CPT | Performed by: STUDENT IN AN ORGANIZED HEALTH CARE EDUCATION/TRAINING PROGRAM

## 2020-10-06 PROCEDURE — 80053 COMPREHEN METABOLIC PANEL: CPT | Performed by: PHYSICIAN ASSISTANT

## 2020-10-06 RX ORDER — PANTOPRAZOLE SODIUM 40 MG/1
40 TABLET, DELAYED RELEASE ORAL
Qty: 30 TABLET | Refills: 0 | Status: SHIPPED | OUTPATIENT
Start: 2020-10-07 | End: 2022-03-25

## 2020-10-06 RX ORDER — DICYCLOMINE HCL 20 MG
20 TABLET ORAL
Status: DISCONTINUED | OUTPATIENT
Start: 2020-10-06 | End: 2020-10-06 | Stop reason: HOSPADM

## 2020-10-06 RX ORDER — ONDANSETRON 8 MG/1
8 TABLET, ORALLY DISINTEGRATING ORAL EVERY 8 HOURS PRN
Qty: 20 TABLET | Refills: 0 | Status: SHIPPED | OUTPATIENT
Start: 2020-10-06 | End: 2022-03-25

## 2020-10-06 RX ORDER — DICYCLOMINE HCL 20 MG
20 TABLET ORAL 4 TIMES DAILY PRN
Qty: 56 TABLET | Refills: 0 | Status: SHIPPED | OUTPATIENT
Start: 2020-10-06 | End: 2020-10-07

## 2020-10-06 RX ORDER — PANTOPRAZOLE SODIUM 40 MG/1
40 TABLET, DELAYED RELEASE ORAL
Status: DISCONTINUED | OUTPATIENT
Start: 2020-10-07 | End: 2020-10-06 | Stop reason: HOSPADM

## 2020-10-06 RX ORDER — DICYCLOMINE HCL 20 MG
20 TABLET ORAL
Qty: 56 TABLET | Refills: 0 | Status: SHIPPED | OUTPATIENT
Start: 2020-10-06 | End: 2020-10-06

## 2020-10-06 RX ADMIN — DICYCLOMINE HYDROCHLORIDE 20 MG: 20 TABLET ORAL at 14:29

## 2020-10-06 RX ADMIN — ENOXAPARIN SODIUM 40 MG: 40 INJECTION SUBCUTANEOUS at 09:29

## 2020-10-07 RX ORDER — DICYCLOMINE HCL 20 MG
20 TABLET ORAL 4 TIMES DAILY PRN
Qty: 56 TABLET | Refills: 0 | Status: SHIPPED | OUTPATIENT
Start: 2020-10-07 | End: 2022-03-25

## 2020-10-08 ENCOUNTER — TELEPHONE (OUTPATIENT)
Dept: GASTROENTEROLOGY | Facility: MEDICAL CENTER | Age: 45
End: 2020-10-08

## 2020-11-10 ENCOUNTER — TELEPHONE (OUTPATIENT)
Dept: GASTROENTEROLOGY | Facility: CLINIC | Age: 45
End: 2020-11-10

## 2020-11-12 ENCOUNTER — DOCUMENTATION (OUTPATIENT)
Dept: GASTROENTEROLOGY | Facility: CLINIC | Age: 45
End: 2020-11-12

## 2021-01-19 ENCOUNTER — OFFICE VISIT (OUTPATIENT)
Dept: URGENT CARE | Facility: CLINIC | Age: 46
End: 2021-01-19

## 2021-01-19 VITALS
TEMPERATURE: 96.4 F | HEIGHT: 72 IN | RESPIRATION RATE: 18 BRPM | HEART RATE: 98 BPM | BODY MASS INDEX: 33.18 KG/M2 | WEIGHT: 245 LBS | OXYGEN SATURATION: 98 %

## 2021-01-19 DIAGNOSIS — Z11.59 SPECIAL SCREENING EXAMINATION FOR UNSPECIFIED VIRAL DISEASE: Primary | ICD-10-CM

## 2021-01-19 PROCEDURE — G0383 LEV 4 HOSP TYPE B ED VISIT: HCPCS | Performed by: NURSE PRACTITIONER

## 2021-01-19 PROCEDURE — U0005 INFEC AGEN DETEC AMPLI PROBE: HCPCS | Performed by: NURSE PRACTITIONER

## 2021-01-19 PROCEDURE — U0003 INFECTIOUS AGENT DETECTION BY NUCLEIC ACID (DNA OR RNA); SEVERE ACUTE RESPIRATORY SYNDROME CORONAVIRUS 2 (SARS-COV-2) (CORONAVIRUS DISEASE [COVID-19]), AMPLIFIED PROBE TECHNIQUE, MAKING USE OF HIGH THROUGHPUT TECHNOLOGIES AS DESCRIBED BY CMS-2020-01-R: HCPCS | Performed by: NURSE PRACTITIONER

## 2021-01-19 NOTE — PROGRESS NOTES
[facility]        NAME: Samanta Trinidad is a 50 y o  male  : 1975    MRN: [id number]  DATE: 2021  TIME: 4:33 PM    Assessment and Plan   Special screening examination for unspecified viral disease [Z11 59]  1  Special screening examination for unspecified viral disease  Novel Coronavirus (Covid-19),PCR [facility] - Office Collection         Patient Instructions     Patient Instructions   Patient instructed to self quarantine  Advised to avoid nsaids  If you develop prolonged high fever, worsening or productive cough, shortness of breath, difficulty breathing, chest pain, or any new or concerning symptoms please proceed ER  Instructed patient to call ER prior to arrival make them aware she is being test for covid  Patient verbalizes understanding  Start vitamin c 1g twice daily,vitamin d3 2000IU daily, and multivitamin  monitor pulse ox  Call PCP in 24 hours for f/u  [address]    Your healthcare provider and/or public health staff have evaluated you and have determined that you do not need to remain in the hospital at this time  At this time you can be isolated at home where you will be monitored by staff from your local or state health department  You should carefully follow the prevention and isolation steps below until a healthcare provider or local or state health department says that you can return to your normal activities  Stay home except to get medical care    People who are mildly ill with COVID-19 are able to isolate at home during their illness  You should restrict activities outside your home, except for getting medical care  Do not go to work, school, or public areas  Avoid using public transportation, ride-sharing, or taxis  Separate yourself from other people and animals in your home    People: As much as possible, you should stay in a specific room and away from other people in your home   Also, you should use a separate bathroom, if available  Animals: You should restrict contact with pets and other animals while you are sick with COVID-19, just like you would around other people  Although there have not been reports of pets or other animals becoming sick with COVID-19, it is still recommended that people sick with COVID-19 limit contact with animals until more information is known about the virus  When possible, have another member of your household care for your animals while you are sick  If you are sick with COVID-19, avoid contact with your pet, including petting, snuggling, being kissed or licked, and sharing food  If you must care for your pet or be around animals while you are sick, wash your hands before and after you interact with pets and wear a facemask  See COVID-19 and Animals for more information  Call ahead before visiting your doctor    If you have a medical appointment, call the healthcare provider and tell them that you have or may have COVID-19  This will help the healthcare providers office take steps to keep other people from getting infected or exposed  Wear a facemask    You should wear a facemask when you are around other people (e g , sharing a room or vehicle) or pets and before you enter a healthcare providers office  If you are not able to wear a facemask (for example, because it causes trouble breathing), then people who live with you should not stay in the same room with you, or they should wear a facemask if they enter your room  Cover your coughs and sneezes    Cover your mouth and nose with a tissue when you cough or sneeze  Throw used tissues in a lined trash can  Immediately wash your hands with soap and water for at least 20 seconds or, if soap and water are not available, clean your hands with an alcohol-based hand  that contains at least 60% alcohol      Clean your hands often    Wash your hands often with soap and water for at least 20 seconds, especially after blowing your nose, coughing, or sneezing; going to the bathroom; and before eating or preparing food  If soap and water are not readily available, use an alcohol-based hand  with at least 60% alcohol, covering all surfaces of your hands and rubbing them together until they feel dry  Soap and water are the best option if hands are visibly dirty  Avoid touching your eyes, nose, and mouth with unwashed hands  Avoid sharing personal household items    You should not share dishes, drinking glasses, cups, eating utensils, towels, or bedding with other people or pets in your home  After using these items, they should be washed thoroughly with soap and water  Clean all high-touch surfaces everyday    High touch surfaces include counters, tabletops, doorknobs, bathroom fixtures, toilets, phones, keyboards, tablets, and bedside tables  Also, clean any surfaces that may have blood, stool, or body fluids on them  Use a household cleaning spray or wipe, according to the label instructions  Labels contain instructions for safe and effective use of the cleaning product including precautions you should take when applying the product, such as wearing gloves and making sure you have good ventilation during use of the product  Monitor your symptoms    Seek prompt medical attention if your illness is worsening (e g , difficulty breathing)  Before seeking care, call your healthcare provider and tell them that you have, or are being evaluated for, COVID-19  Put on a facemask before you enter the facility  These steps will help the healthcare providers office to keep other people in the office or waiting room from getting infected or exposed  Ask your healthcare provider to call the local or state health department  Persons who are placed under active monitoring or facilitated self-monitoring should follow instructions provided by their local health department or occupational health professionals, as appropriate    If you have a medical emergency and need to call 911, notify the dispatch personnel that you have, or are being evaluated for COVID-19  If possible, put on a facemask before emergency medical services arrive  Discontinuing home isolation    Patients with confirmed COVID-19 should remain under home isolation precautions until the following conditions are met:   - They have had no fever for at least 24 hours (that is one full day of no fever without the use medicine that reduces fevers)  AND  - other symptoms have improved (for example, when their cough or shortness of breath have improved)  AND  - If had mild or moderate illness, at least 10 days have passed since their symptoms first appeared or if severe illness (needed oxygen) or immunosuppressed, at least 20 days have passed since symptoms first appeared  Patients with confirmed COVID-19 should also notify close contacts (including their workplace) and ask that they self-quarantine  Currently, close contact is defined as being within 6 feet for 15 minutes or more from the period 24 hours starting 48 hours before symptom onset to the time at which the patient went into isolation  Close contacts of patients diagnosed with COVID-19 should be instructed by the patient to self-quarantine for 14 days from the last time of their last contact with the patient  Source: [employer]        Follow up with PCP in 3-5 days  Proceed to  ER if symptoms worsen  Chief Complaint     Chief Complaint   Patient presents with    COVID-19     Patient c/o SOB, diarrhea, chills, and severe back pain x 4 days  Patient reports exposure to Covid-19 a week ago  History of Present Illness       Patient is a 38-year-old male who presents with a 4 day history of fever, fatigue, and chills  Patient also notes cough, congestion, shortness of breath, and back pain    Patient states he was exposed to his daughter has a positive for COVID-19, 1 week ago  Patient also notes that his wife is symptomatic  Patient states cough is nonproductive  Notes shortness of breath is present with exertion  Denies any chest pain or palpitations  Denies any loss of taste or smell  Denies any decreased appetite fluid intake  Has been taking over-the-counter Tylenol with mild relief  Review of Systems   Review of Systems   Constitutional: Positive for chills, fatigue and fever  Negative for diaphoresis  HENT: Positive for congestion and rhinorrhea  Negative for ear discharge, ear pain, facial swelling, mouth sores, postnasal drip, sinus pressure, sinus pain, sore throat and trouble swallowing  Eyes: Negative for photophobia and visual disturbance  Respiratory: Positive for cough and shortness of breath  Negative for chest tightness, wheezing and stridor  Cardiovascular: Negative for chest pain and palpitations  Gastrointestinal: Negative for abdominal pain, diarrhea, nausea and vomiting  Genitourinary: Negative  Musculoskeletal: Positive for back pain  Negative for arthralgias, joint swelling, myalgias, neck pain and neck stiffness  Skin: Negative for rash  Neurological: Negative for dizziness, syncope, facial asymmetry, weakness, light-headedness, numbness and headaches           Current Medications       Current Outpatient Medications:     dicyclomine (BENTYL) 20 mg tablet, Take 1 tablet (20 mg total) by mouth 4 (four) times a day as needed (abdominal spasms) for up to 14 days (Patient not taking: Reported on 1/19/2021), Disp: 56 tablet, Rfl: 0    ondansetron (ZOFRAN-ODT) 8 mg disintegrating tablet, Take 1 tablet (8 mg total) by mouth every 8 (eight) hours as needed for nausea or vomiting (Patient not taking: Reported on 1/19/2021), Disp: 20 tablet, Rfl: 0    pantoprazole (PROTONIX) 40 mg tablet, Take 1 tablet (40 mg total) by mouth daily before breakfast (Patient not taking: Reported on 1/19/2021), Disp: 30 tablet, Rfl: 0    Current Allergies     Allergies as of 01/19/2021 - Reviewed 01/19/2021   Allergen Reaction Noted    Gabapentin  10/04/2020    Penicillins Hives and Throat Swelling 10/19/2009    Toradol [ketorolac tromethamine]  10/05/2020    Tramadol Other (See Comments) 02/25/2019            The following portions of the patient's history were reviewed and updated as appropriate: allergies, current medications, past family history, past medical history, past social history, past surgical history and problem list      Past Medical History:   Diagnosis Date    Gout        Past Surgical History:   Procedure Laterality Date    ARTHROSCOPY KNEE      FASCIOTOMY      LEG SURGERY      OSTEOTOMY      TONSILLECTOMY      VASECTOMY         No family history on file  Medications have been verified  Objective   Pulse 98   Temp (!) 96 4 °F (35 8 °C) (Temporal)   Resp 18   Ht 6' (1 829 m)   Wt 111 kg (245 lb)   SpO2 98%   BMI 33 23 kg/m²   No LMP for male patient  Physical Exam     Physical Exam  Constitutional:       General: He is not in acute distress  Appearance: He is well-developed  HENT:      Head: Normocephalic and atraumatic  Right Ear: Hearing, tympanic membrane, ear canal and external ear normal       Left Ear: Hearing, tympanic membrane, ear canal and external ear normal       Nose: Rhinorrhea present  No mucosal edema  Right Sinus: No maxillary sinus tenderness or frontal sinus tenderness  Left Sinus: No maxillary sinus tenderness or frontal sinus tenderness  Mouth/Throat:      Mouth: Mucous membranes are moist       Pharynx: Oropharynx is clear  Uvula midline  No oropharyngeal exudate or posterior oropharyngeal erythema  Tonsils: 0 on the right  0 on the left  Cardiovascular:      Rate and Rhythm: Normal rate and regular rhythm        Heart sounds: Normal heart sounds, S1 normal and S2 normal    Pulmonary:      Effort: Pulmonary effort is normal       Breath sounds: Normal breath sounds and air entry  Lymphadenopathy:      Cervical: No cervical adenopathy  Skin:     General: Skin is warm and dry  Capillary Refill: Capillary refill takes less than 2 seconds  Neurological:      Mental Status: He is alert and oriented to person, place, and time

## 2021-01-19 NOTE — PATIENT INSTRUCTIONS
Patient instructed to self quarantine  Advised to avoid nsaids  If you develop prolonged high fever, worsening or productive cough, shortness of breath, difficulty breathing, chest pain, or any new or concerning symptoms please proceed ER  Instructed patient to call ER prior to arrival make them aware she is being test for covid  Patient verbalizes understanding  Start vitamin c 1g twice daily,vitamin d3 2000IU daily, and multivitamin  monitor pulse ox  Call PCP in 24 hours for f/u  [address]    Your healthcare provider and/or public health staff have evaluated you and have determined that you do not need to remain in the hospital at this time  At this time you can be isolated at home where you will be monitored by staff from your local or state health department  You should carefully follow the prevention and isolation steps below until a healthcare provider or local or state health department says that you can return to your normal activities  Stay home except to get medical care    People who are mildly ill with COVID-19 are able to isolate at home during their illness  You should restrict activities outside your home, except for getting medical care  Do not go to work, school, or public areas  Avoid using public transportation, ride-sharing, or taxis  Separate yourself from other people and animals in your home    People: As much as possible, you should stay in a specific room and away from other people in your home  Also, you should use a separate bathroom, if available  Animals: You should restrict contact with pets and other animals while you are sick with COVID-19, just like you would around other people  Although there have not been reports of pets or other animals becoming sick with COVID-19, it is still recommended that people sick with COVID-19 limit contact with animals until more information is known about the virus   When possible, have another member of your household care for your animals while you are sick  If you are sick with COVID-19, avoid contact with your pet, including petting, snuggling, being kissed or licked, and sharing food  If you must care for your pet or be around animals while you are sick, wash your hands before and after you interact with pets and wear a facemask  See COVID-19 and Animals for more information  Call ahead before visiting your doctor    If you have a medical appointment, call the healthcare provider and tell them that you have or may have COVID-19  This will help the healthcare providers office take steps to keep other people from getting infected or exposed  Wear a facemask    You should wear a facemask when you are around other people (e g , sharing a room or vehicle) or pets and before you enter a healthcare providers office  If you are not able to wear a facemask (for example, because it causes trouble breathing), then people who live with you should not stay in the same room with you, or they should wear a facemask if they enter your room  Cover your coughs and sneezes    Cover your mouth and nose with a tissue when you cough or sneeze  Throw used tissues in a lined trash can  Immediately wash your hands with soap and water for at least 20 seconds or, if soap and water are not available, clean your hands with an alcohol-based hand  that contains at least 60% alcohol  Clean your hands often    Wash your hands often with soap and water for at least 20 seconds, especially after blowing your nose, coughing, or sneezing; going to the bathroom; and before eating or preparing food  If soap and water are not readily available, use an alcohol-based hand  with at least 60% alcohol, covering all surfaces of your hands and rubbing them together until they feel dry  Soap and water are the best option if hands are visibly dirty  Avoid touching your eyes, nose, and mouth with unwashed hands      Avoid sharing personal household items    You should not share dishes, drinking glasses, cups, eating utensils, towels, or bedding with other people or pets in your home  After using these items, they should be washed thoroughly with soap and water  Clean all high-touch surfaces everyday    High touch surfaces include counters, tabletops, doorknobs, bathroom fixtures, toilets, phones, keyboards, tablets, and bedside tables  Also, clean any surfaces that may have blood, stool, or body fluids on them  Use a household cleaning spray or wipe, according to the label instructions  Labels contain instructions for safe and effective use of the cleaning product including precautions you should take when applying the product, such as wearing gloves and making sure you have good ventilation during use of the product  Monitor your symptoms    Seek prompt medical attention if your illness is worsening (e g , difficulty breathing)  Before seeking care, call your healthcare provider and tell them that you have, or are being evaluated for, COVID-19  Put on a facemask before you enter the facility  These steps will help the healthcare providers office to keep other people in the office or waiting room from getting infected or exposed  Ask your healthcare provider to call the local or [facility] health department  Persons who are placed under active monitoring or facilitated self-monitoring should follow instructions provided by their local health department or occupational health professionals, as appropriate  If you have a medical emergency and need to call 911, notify the dispatch personnel that you have, or are being evaluated for COVID-19  If possible, put on a facemask before emergency medical services arrive      Discontinuing home isolation    Patients with confirmed COVID-19 should remain under home isolation precautions until the following conditions are met:   - They have had no fever for at least 24 hours (that is one full day of no fever without the use medicine that reduces fevers)  AND  - other symptoms have improved (for example, when their cough or shortness of breath have improved)  AND  - If had mild or moderate illness, at least 10 days have passed since their symptoms first appeared or if severe illness (needed oxygen) or immunosuppressed, at least 20 days have passed since symptoms first appeared  Patients with confirmed COVID-19 should also notify close contacts (including their workplace) and ask that they self-quarantine  Currently, close contact is defined as being within 6 feet for 15 minutes or more from the period 24 hours starting 48 hours before symptom onset to the time at which the patient went into isolation  Close contacts of patients diagnosed with COVID-19 should be instructed by the patient to self-quarantine for 14 days from the last time of their last contact with the patient       Source: [employer]

## 2021-01-19 NOTE — LETTER
January 19, 2021     Patient: Anne-Marie Vogt   YOB: 1975   Date of Visit: 1/19/2021       To Whom it May Concern:    Anne-Marie Vogt was seen in my clinic on 1/19/2021  He may return to work on when cleared by provider  If you have any questions or concerns, please don't hesitate to call           Sincerely,          MAYURI Vera        CC: No Recipients

## 2021-01-20 ENCOUNTER — TELEPHONE (OUTPATIENT)
Dept: URGENT CARE | Facility: CLINIC | Age: 46
End: 2021-01-20

## 2021-01-20 LAB — SARS-COV-2 RNA RESP QL NAA+PROBE: POSITIVE

## 2021-01-20 NOTE — TELEPHONE ENCOUNTER
States he is feeling better  He continues to have some cramping in back  Slight SOB and chest discomfort  He felt feverish  No dyspnea, able to speak in full sentences  Patient aware of positive COVID 19 test   Discussed with patient they will need to isolate for a total of 10 days since onset of symptoms  They can come off isolation at that time as long as symptoms are improving and they are fever free for 24 hours  Continue to rest and drink extra fluids  Continue Vitamin C, Vitamin D and multivitamin as discussed as visit  Continue to ambulate and walk as much as possible  Tylenol as needed for pain or fever  Follow up with PCP as they may be able to virtual visit as recheck  Go to ER with any chest pain, sob, difficulty breathing, lethargy, confusion, dehydration, change in skin color  Patient should be wearing a mask in home when in shared spaces, all cathy touch surfaces need to be wiped frequently and they should have own bathroom to use if possible  Other family members will need to quarantine for a minimum of 10 days after last exposure to patient

## 2021-01-26 ENCOUNTER — TELEPHONE (OUTPATIENT)
Dept: URGENT CARE | Facility: CLINIC | Age: 46
End: 2021-01-26

## 2021-10-28 ENCOUNTER — APPOINTMENT (OUTPATIENT)
Dept: RADIOLOGY | Facility: CLINIC | Age: 46
End: 2021-10-28

## 2021-10-28 ENCOUNTER — OFFICE VISIT (OUTPATIENT)
Dept: URGENT CARE | Facility: CLINIC | Age: 46
End: 2021-10-28

## 2021-10-28 VITALS
RESPIRATION RATE: 18 BRPM | SYSTOLIC BLOOD PRESSURE: 129 MMHG | HEIGHT: 71 IN | BODY MASS INDEX: 37.1 KG/M2 | HEART RATE: 79 BPM | DIASTOLIC BLOOD PRESSURE: 83 MMHG | TEMPERATURE: 97.6 F | OXYGEN SATURATION: 97 % | WEIGHT: 265 LBS

## 2021-10-28 DIAGNOSIS — S89.91XA RIGHT LEG INJURY, INITIAL ENCOUNTER: Primary | ICD-10-CM

## 2021-10-28 DIAGNOSIS — S89.91XA RIGHT LEG INJURY, INITIAL ENCOUNTER: ICD-10-CM

## 2021-10-28 DIAGNOSIS — S76.019A HIP STRAIN, INITIAL ENCOUNTER: ICD-10-CM

## 2021-10-28 DIAGNOSIS — S86.911A KNEE STRAIN, RIGHT, INITIAL ENCOUNTER: ICD-10-CM

## 2021-10-28 PROCEDURE — G0382 LEV 3 HOSP TYPE B ED VISIT: HCPCS | Performed by: NURSE PRACTITIONER

## 2021-10-28 PROCEDURE — 73502 X-RAY EXAM HIP UNI 2-3 VIEWS: CPT

## 2021-10-28 PROCEDURE — 73564 X-RAY EXAM KNEE 4 OR MORE: CPT

## 2021-10-28 RX ORDER — METHYLPREDNISOLONE 4 MG/1
TABLET ORAL
Qty: 21 TABLET | Refills: 0 | Status: SHIPPED | OUTPATIENT
Start: 2021-10-28 | End: 2022-03-25

## 2021-12-10 ENCOUNTER — NURSE TRIAGE (OUTPATIENT)
Dept: OTHER | Facility: OTHER | Age: 46
End: 2021-12-10

## 2021-12-10 DIAGNOSIS — Z20.828 SARS-ASSOCIATED CORONAVIRUS EXPOSURE: Primary | ICD-10-CM

## 2021-12-11 PROCEDURE — U0005 INFEC AGEN DETEC AMPLI PROBE: HCPCS | Performed by: FAMILY MEDICINE

## 2021-12-11 PROCEDURE — U0003 INFECTIOUS AGENT DETECTION BY NUCLEIC ACID (DNA OR RNA); SEVERE ACUTE RESPIRATORY SYNDROME CORONAVIRUS 2 (SARS-COV-2) (CORONAVIRUS DISEASE [COVID-19]), AMPLIFIED PROBE TECHNIQUE, MAKING USE OF HIGH THROUGHPUT TECHNOLOGIES AS DESCRIBED BY CMS-2020-01-R: HCPCS | Performed by: FAMILY MEDICINE

## 2021-12-13 ENCOUNTER — OFFICE VISIT (OUTPATIENT)
Dept: URGENT CARE | Facility: CLINIC | Age: 46
End: 2021-12-13
Payer: COMMERCIAL

## 2021-12-13 VITALS
TEMPERATURE: 98.3 F | HEART RATE: 87 BPM | WEIGHT: 265 LBS | BODY MASS INDEX: 36.96 KG/M2 | RESPIRATION RATE: 18 BRPM | OXYGEN SATURATION: 97 %

## 2021-12-13 DIAGNOSIS — B96.89 ACUTE BACTERIAL BRONCHITIS: Primary | ICD-10-CM

## 2021-12-13 DIAGNOSIS — J20.8 ACUTE BACTERIAL BRONCHITIS: Primary | ICD-10-CM

## 2021-12-13 PROCEDURE — 0241U HB NFCT DS VIR RESP RNA 4 TRGT: CPT | Performed by: PHYSICIAN ASSISTANT

## 2021-12-13 PROCEDURE — G0382 LEV 3 HOSP TYPE B ED VISIT: HCPCS | Performed by: PHYSICIAN ASSISTANT

## 2021-12-13 RX ORDER — AZITHROMYCIN 250 MG/1
TABLET, FILM COATED ORAL
Qty: 6 TABLET | Refills: 0 | Status: SHIPPED | OUTPATIENT
Start: 2021-12-13 | End: 2021-12-17

## 2021-12-13 RX ORDER — PREDNISONE 20 MG/1
40 TABLET ORAL DAILY
Qty: 10 TABLET | Refills: 0 | Status: SHIPPED | OUTPATIENT
Start: 2021-12-13 | End: 2022-03-25

## 2021-12-13 RX ORDER — BENZONATATE 200 MG/1
200 CAPSULE ORAL 3 TIMES DAILY PRN
Qty: 20 CAPSULE | Refills: 0 | Status: SHIPPED | OUTPATIENT
Start: 2021-12-13 | End: 2022-03-25

## 2021-12-14 LAB
FLUAV RNA RESP QL NAA+PROBE: NEGATIVE
FLUBV RNA RESP QL NAA+PROBE: NEGATIVE
RSV RNA RESP QL NAA+PROBE: NEGATIVE
SARS-COV-2 RNA RESP QL NAA+PROBE: NEGATIVE

## 2022-03-25 ENCOUNTER — OFFICE VISIT (OUTPATIENT)
Dept: URGENT CARE | Facility: CLINIC | Age: 47
End: 2022-03-25
Payer: COMMERCIAL

## 2022-03-25 VITALS
OXYGEN SATURATION: 97 % | RESPIRATION RATE: 18 BRPM | HEART RATE: 67 BPM | HEIGHT: 71 IN | TEMPERATURE: 98.3 F | BODY MASS INDEX: 35 KG/M2 | WEIGHT: 250 LBS

## 2022-03-25 DIAGNOSIS — R05.9 COUGH: Primary | ICD-10-CM

## 2022-03-25 PROCEDURE — S9083 URGENT CARE CENTER GLOBAL: HCPCS

## 2022-03-25 PROCEDURE — G0382 LEV 3 HOSP TYPE B ED VISIT: HCPCS

## 2022-03-25 NOTE — PROGRESS NOTES
[facility]        NAME: Roberto Borrero is a 48 y o  male  : 1975    MRN: [id number]  DATE: 2022  TIME: 4:41 PM    Assessment and Plan   Cough [R05 9]  1  Cough        He is ready to work note to return to work  Patient Instructions   You are good to go to work tomorrow  Be mindful, the next time you have a sudden severe onset of a headache go to the emergency department as this could be signs and symptoms of a brain bleed  The next time you have a headache use ibuprofen instead of Tylenol  Follow up with PCP in 3-5 days  Proceed to  ER if symptoms worsen  Chief Complaint     Chief Complaint   Patient presents with    Cough     cough, headache, rib pain with coughing for 3 days         History of Present Illness     Roberto Borrero is a 48 y o  male who presents with complaint of mild cough, but wants a work note to return to work tomorrow  He states that he had a headache that presented 4 days ago, stated to be the worst that he has experienced  This headache lasted until today, he has been using Mucinex and Tylenol with relief of his symptoms  He states that his cough leads to associated posterior rib pain, worse when he takes a deep breath  He denies fevers, chills, night sweats  Denies chest pain or shortness of breath with activity  Denies nausea, vomiting, diarrhea  Headache 4 days ago, lasted until today  Taking mucinex with relief   Tylenol for headache  Review of Systems   Review of Systems   Constitutional: Negative for chills, fatigue and fever  HENT: Positive for congestion  Negative for ear pain, rhinorrhea, sinus pressure, sinus pain and sore throat  Respiratory: Positive for cough  Negative for chest tightness, shortness of breath and wheezing  Cardiovascular: Negative for chest pain and palpitations  Gastrointestinal: Negative for abdominal pain, constipation, diarrhea, nausea and vomiting     Skin: Negative for color change, pallor, rash and wound  Neurological: Positive for headaches  All other systems reviewed and are negative  Current Medications     No current outpatient medications on file  Current Allergies     Allergies as of 03/25/2022 - Reviewed 03/25/2022   Allergen Reaction Noted    Gabapentin  10/04/2020    Penicillins Hives and Throat Swelling 10/19/2009    Toradol [ketorolac tromethamine]  10/05/2020    Tramadol Other (See Comments) 02/25/2019            The following portions of the patient's history were reviewed and updated as appropriate: allergies, current medications, past family history, past medical history, past social history, past surgical history and problem list      Past Medical History:   Diagnosis Date    Gout        Past Surgical History:   Procedure Laterality Date    ARTHROSCOPY KNEE      FASCIOTOMY      LEG SURGERY      OSTEOTOMY      TONSILLECTOMY      VASECTOMY         History reviewed  No pertinent family history  Medications have been verified  Objective   Pulse 67   Temp 98 3 °F (36 8 °C) (Temporal)   Resp 18   Ht 5' 11" (1 803 m)   Wt 113 kg (250 lb)   SpO2 97%   BMI 34 87 kg/m²   No LMP for male patient  Physical Exam     Physical Exam  Vitals reviewed  Constitutional:       Appearance: Normal appearance  HENT:      Head: Normocephalic and atraumatic  Right Ear: Hearing, tympanic membrane, ear canal and external ear normal  There is no impacted cerumen  Left Ear: Hearing, tympanic membrane, ear canal and external ear normal  There is no impacted cerumen  Nose: No congestion or rhinorrhea  Mouth/Throat:      Mouth: Mucous membranes are moist       Pharynx: Oropharynx is clear  Uvula midline  No oropharyngeal exudate, posterior oropharyngeal erythema or uvula swelling  Eyes:      General:         Right eye: No discharge  Left eye: No discharge  Extraocular Movements: Extraocular movements intact  Conjunctiva/sclera: Conjunctivae normal       Pupils: Pupils are equal, round, and reactive to light  Cardiovascular:      Rate and Rhythm: Normal rate and regular rhythm  Heart sounds: Normal heart sounds  No murmur heard  No friction rub  No gallop  Pulmonary:      Effort: Pulmonary effort is normal       Breath sounds: Normal breath sounds  No wheezing, rhonchi or rales  Neurological:      Mental Status: He is alert

## 2022-03-25 NOTE — PATIENT INSTRUCTIONS
You are good to go to work tomorrow  Be mindful, the next time you have a sudden severe onset of a headache go to the emergency department as this could be signs and symptoms of a brain bleed

## 2022-03-25 NOTE — LETTER
March 25, 2022     Patient: Kuldeep Granger   YOB: 1975   Date of Visit: 3/25/2022       To Whom it May Concern:    Kuldeep Granger was seen in my clinic on 3/25/2022  He has been sick for the past 4 days with a headache, cough, and rib pain, but now he is improved and fit to return to duty tomorrow 3/26/2022  If you have any questions or concerns, please don't hesitate to call           Sincerely,          Pasquale Adam PA-C        CC: No Recipients

## 2023-10-09 ENCOUNTER — OFFICE VISIT (OUTPATIENT)
Dept: URGENT CARE | Facility: CLINIC | Age: 48
End: 2023-10-09
Payer: COMMERCIAL

## 2023-10-09 VITALS
HEART RATE: 73 BPM | HEIGHT: 71 IN | SYSTOLIC BLOOD PRESSURE: 140 MMHG | DIASTOLIC BLOOD PRESSURE: 86 MMHG | TEMPERATURE: 97.7 F | WEIGHT: 280 LBS | RESPIRATION RATE: 18 BRPM | BODY MASS INDEX: 39.2 KG/M2 | OXYGEN SATURATION: 97 %

## 2023-10-09 DIAGNOSIS — R05.1 ACUTE COUGH: Primary | ICD-10-CM

## 2023-10-09 PROCEDURE — 99213 OFFICE O/P EST LOW 20 MIN: CPT | Performed by: PHYSICIAN ASSISTANT

## 2023-10-09 RX ORDER — FLUTICASONE PROPIONATE 50 MCG
2 SPRAY, SUSPENSION (ML) NASAL DAILY
COMMUNITY
Start: 2023-10-03

## 2023-10-09 RX ORDER — TIOTROPIUM BROMIDE 18 UG/1
1 CAPSULE ORAL; RESPIRATORY (INHALATION) DAILY
COMMUNITY
Start: 2023-05-26

## 2023-10-09 RX ORDER — PREDNISONE 20 MG/1
40 TABLET ORAL DAILY
Qty: 8 TABLET | Refills: 0 | Status: SHIPPED | OUTPATIENT
Start: 2023-10-09 | End: 2023-10-13

## 2023-10-09 RX ORDER — ALBUTEROL SULFATE 90 UG/1
AEROSOL, METERED RESPIRATORY (INHALATION)
COMMUNITY
Start: 2023-08-26

## 2023-10-09 RX ORDER — TRIAMCINOLONE ACETONIDE 1 MG/G
1 CREAM TOPICAL 2 TIMES DAILY
COMMUNITY
Start: 2023-10-03

## 2023-10-09 RX ORDER — DOXYCYCLINE HYCLATE 100 MG
TABLET ORAL
COMMUNITY
Start: 2023-10-03

## 2023-10-10 NOTE — PROGRESS NOTES
[facility]        NAME: Kimberly Marte is a 50 y.o. male  : 1975    MRN: [id number]  DATE: 2023  TIME: 8:24 PM    Assessment and Plan   Acute cough [R05.1]  1. Acute cough  predniSONE 20 mg tablet            Patient Instructions   Patient Instructions   Recommend finishing course of doxycycline as instructed for sinusitis. Continue daily Flonase. Will also treat with 4-day course of prednisone. Discussed if there is any progression or worsening of symptoms to be seen in ER. All patient's questions answered. Follow up with PCP in 3-5 days. Proceed to  ER if symptoms worsen. Chief Complaint     Chief Complaint   Patient presents with   • Sinusitis     Started 4 weeks ago. Pain in lower back when breathing. History of lung problems. History of Present Illness       Patient is a 47-year-old male presenting today with cold-like symptoms x3 weeks. Patient notes over the last few weeks he has been experiencing some nasal congestion, sinus pressure and a cough, was seen and evaluated at [facility] urgent care last week, was started on an antibiotic for sinus infection, has 2 days remaining of doxycycline that was prescribed as well as Flonase, notes he has not had much change or improvement of his symptoms, is now experiencing some pain discomfort of his ribs particularly when coughing. Denies fever, SOB, wheezing, and/V/D, hemoptysis. Review of Systems   Review of Systems   Constitutional: Negative for chills and fever. HENT: Positive for congestion, postnasal drip, sinus pressure and sinus pain. Negative for ear pain and sore throat. Eyes: Negative for pain and visual disturbance. Respiratory: Positive for cough. Negative for shortness of breath. Cardiovascular: Negative for chest pain and palpitations. Gastrointestinal: Negative for abdominal pain and vomiting. Genitourinary: Negative for dysuria and hematuria.    Musculoskeletal: Negative for arthralgias and back pain. Skin: Negative for color change and rash. Neurological: Negative for seizures and syncope. All other systems reviewed and are negative. Current Medications       Current Outpatient Medications:   •  predniSONE 20 mg tablet, Take 2 tablets (40 mg total) by mouth daily for 4 days, Disp: 8 tablet, Rfl: 0  •  tiotropium (Spiriva HandiHaler) 18 mcg inhalation capsule, Place 1 capsule into inhaler and inhale daily, Disp: , Rfl:   •  albuterol (PROVENTIL HFA,VENTOLIN HFA) 90 mcg/act inhaler, INHALE 2 PUFFS BY MOUTH AND INTO THE LUNGS EVERY 6 HOURS AS NEEDED FOR WHEEZING, Disp: , Rfl:   •  doxycycline hyclate (VIBRA-TABS) 100 mg tablet, take 1 tablet by mouth twice a day for 7 days, Disp: , Rfl:   •  fluticasone (FLONASE) 50 mcg/act nasal spray, 2 sprays daily, Disp: , Rfl:   •  triamcinolone (KENALOG) 0.1 % cream, Apply 1 Application topically 2 (two) times a day, Disp: , Rfl:     Current Allergies     Allergies as of 10/09/2023 - Reviewed 10/09/2023   Allergen Reaction Noted   • Penicillins Hives and Throat Swelling 10/19/2009   • Gabapentin  10/04/2020   • Toradol [ketorolac tromethamine]  10/05/2020   • Tramadol Other (See Comments) 02/25/2019            The following portions of the patient's history were reviewed and updated as appropriate: allergies, current medications, past family history, past medical history, past social history, past surgical history and problem list.     Past Medical History:   Diagnosis Date   • Gout        Past Surgical History:   Procedure Laterality Date   • ARTHROSCOPY KNEE     • FASCIOTOMY     • LEG SURGERY     • OSTEOTOMY     • TONSILLECTOMY     • VASECTOMY         No family history on file. Medications have been verified.         Objective   /86   Pulse 73   Temp 97.7 °F (36.5 °C)   Resp 18   Ht 5' 11" (1.803 m)   Wt 127 kg (280 lb)   SpO2 97%   BMI 39.05 kg/m²        Physical Exam     Physical Exam  Vitals and nursing note reviewed. Constitutional:       General: He is not in acute distress. HENT:      Head: Normocephalic. Right Ear: Tympanic membrane, ear canal and external ear normal.      Left Ear: Tympanic membrane, ear canal and external ear normal.      Nose: Congestion present. Mouth/Throat:      Mouth: Mucous membranes are moist.      Pharynx: Oropharynx is clear. Cardiovascular:      Rate and Rhythm: Normal rate and regular rhythm. Pulses: Normal pulses. Heart sounds: Normal heart sounds. Pulmonary:      Effort: Pulmonary effort is normal. No respiratory distress. Breath sounds: Normal breath sounds. No wheezing, rhonchi or rales. Comments: Mild TTP to left and right anterior ribs upon palpation, no palpable crepitus, SPO2 97% indicating adequate oxygenation  Skin:     General: Skin is warm. Capillary Refill: Capillary refill takes less than 2 seconds. Neurological:      Mental Status: He is alert.

## 2023-10-10 NOTE — PATIENT INSTRUCTIONS
Recommend finishing course of doxycycline as instructed for sinusitis. Continue daily Flonase. Will also treat with 4-day course of prednisone. Discussed if there is any progression or worsening of symptoms to be seen in ER. All patient's questions answered.

## 2023-10-24 ENCOUNTER — OFFICE VISIT (OUTPATIENT)
Dept: URGENT CARE | Facility: CLINIC | Age: 48
End: 2023-10-24
Payer: COMMERCIAL

## 2023-10-24 VITALS
DIASTOLIC BLOOD PRESSURE: 80 MMHG | RESPIRATION RATE: 18 BRPM | OXYGEN SATURATION: 97 % | HEART RATE: 81 BPM | TEMPERATURE: 98.8 F | SYSTOLIC BLOOD PRESSURE: 125 MMHG

## 2023-10-24 DIAGNOSIS — L25.9 CONTACT DERMATITIS, UNSPECIFIED CONTACT DERMATITIS TYPE, UNSPECIFIED TRIGGER: Primary | ICD-10-CM

## 2023-10-24 PROCEDURE — 99213 OFFICE O/P EST LOW 20 MIN: CPT | Performed by: FAMILY MEDICINE

## 2023-10-24 RX ORDER — HYDROXYZINE HYDROCHLORIDE 25 MG/1
25 TABLET, FILM COATED ORAL EVERY 6 HOURS PRN
Qty: 20 TABLET | Refills: 0 | Status: SHIPPED | OUTPATIENT
Start: 2023-10-24

## 2023-10-24 RX ORDER — METHYLPREDNISOLONE 4 MG/1
TABLET ORAL
Qty: 1 EACH | Refills: 0 | Status: SHIPPED | OUTPATIENT
Start: 2023-10-24

## 2023-10-24 NOTE — PROGRESS NOTES
[facility]        NAME: Danya Frances is a 50 y.o. male  : 1975    MRN: [id number]  DATE: 2023  TIME: 7:00 PM    Assessment and Plan   Contact dermatitis, unspecified contact dermatitis type, unspecified trigger [L25.9]  1. Contact dermatitis, unspecified contact dermatitis type, unspecified trigger  methylPREDNISolone 4 MG tablet therapy pack    hydrOXYzine HCL (ATARAX) 25 mg tablet    Ambulatory Referral to Allergy            Patient Instructions       Follow up with PCP in 3-5 days. Proceed to  ER if symptoms worsen. Chief Complaint     Chief Complaint   Patient presents with    Rash     Legs, feet back, upper chest and arms, started about a week ago, was recently seen for same, creme prescribed and was taking benadryl, not working         History of Present Illness       Patient is a 51 y/o/m presenting to [facility] with diffuse rash. Patient reports recurrent episodes of contact dermatitis from unknown triggers. This current rash began 1 week ago. Rash began on chest to neck/back, upper and lower extremities. Patient has used cold/warm baths, Benadryl, tylenol PM.  Pt has used Triamcinolone and gold bond creams. No medications have helped with symptoms. Rash is itchy. Patient denies fever, chillls, body aches, N/V/D. Patient reports drainage from some areas. Rash  This is a recurrent problem. The current episode started in the past 7 days. The problem has been gradually worsening since onset. The rash is diffuse. The rash is characterized by redness, itchiness and draining. He was exposed to nothing. Associated symptoms include itching. Pertinent negatives include no congestion, cough, fatigue, fever or joint pain. Review of Systems   Review of Systems   Constitutional:  Negative for fatigue and fever. HENT:  Negative for congestion. Respiratory:  Negative for cough. Musculoskeletal:  Negative for joint pain. Skin:  Positive for itching and rash. Current Medications       Current Outpatient Medications:     hydrOXYzine HCL (ATARAX) 25 mg tablet, Take 1 tablet (25 mg total) by mouth every 6 (six) hours as needed for itching, Disp: 20 tablet, Rfl: 0    methylPREDNISolone 4 MG tablet therapy pack, Use as directed on package, Disp: 1 each, Rfl: 0    albuterol (PROVENTIL HFA,VENTOLIN HFA) 90 mcg/act inhaler, INHALE 2 PUFFS BY MOUTH AND INTO THE LUNGS EVERY 6 HOURS AS NEEDED FOR WHEEZING, Disp: , Rfl:     doxycycline hyclate (VIBRA-TABS) 100 mg tablet, take 1 tablet by mouth twice a day for 7 days, Disp: , Rfl:     fluticasone (FLONASE) 50 mcg/act nasal spray, 2 sprays daily, Disp: , Rfl:     tiotropium (Spiriva HandiHaler) 18 mcg inhalation capsule, Place 1 capsule into inhaler and inhale daily, Disp: , Rfl:     triamcinolone (KENALOG) 0.1 % cream, Apply 1 Application topically 2 (two) times a day, Disp: , Rfl:     Current Allergies     Allergies as of 10/24/2023 - Reviewed 10/24/2023   Allergen Reaction Noted    Penicillins Hives and Throat Swelling 10/19/2009    Gabapentin  10/04/2020    Toradol [ketorolac tromethamine]  10/05/2020    Tramadol Other (See Comments) 02/25/2019            The following portions of the patient's history were reviewed and updated as appropriate: allergies, current medications, past family history, past medical history, past social history, past surgical history and problem list.     Past Medical History:   Diagnosis Date    Gout        Past Surgical History:   Procedure Laterality Date    ARTHROSCOPY KNEE      FASCIOTOMY      LEG SURGERY      OSTEOTOMY      TONSILLECTOMY      VASECTOMY         History reviewed. No pertinent family history. Medications have been verified. Objective   /80   Pulse 81   Temp 98.8 °F (37.1 °C)   Resp 18   SpO2 97%   No LMP for male patient. Physical Exam     Physical Exam  Constitutional:       Appearance: Normal appearance. He is normal weight.    HENT:      Head: Normocephalic and atraumatic. Nose: Nose normal.      Mouth/Throat:      Mouth: Mucous membranes are moist.   Eyes:      Extraocular Movements: Extraocular movements intact. Conjunctiva/sclera: Conjunctivae normal.      Pupils: Pupils are equal, round, and reactive to light. Cardiovascular:      Rate and Rhythm: Normal rate. Pulmonary:      Effort: Pulmonary effort is normal.   Musculoskeletal:         General: Normal range of motion. Cervical back: Normal range of motion and neck supple. Comments: Diffuse erythematous maculopapules and pustules. Skin:     General: Skin is warm and dry. Neurological:      General: No focal deficit present. Mental Status: He is alert and oriented to person, place, and time.    Psychiatric:         Mood and Affect: Mood normal.         Behavior: Behavior normal.

## 2024-01-17 ENCOUNTER — OFFICE VISIT (OUTPATIENT)
Dept: URGENT CARE | Facility: CLINIC | Age: 49
End: 2024-01-17
Payer: COMMERCIAL

## 2024-01-17 VITALS
SYSTOLIC BLOOD PRESSURE: 137 MMHG | OXYGEN SATURATION: 98 % | RESPIRATION RATE: 22 BRPM | DIASTOLIC BLOOD PRESSURE: 81 MMHG | TEMPERATURE: 99.9 F | HEART RATE: 88 BPM

## 2024-01-17 DIAGNOSIS — M10.9 ACUTE GOUT OF RIGHT FOOT, UNSPECIFIED CAUSE: Primary | ICD-10-CM

## 2024-01-17 PROCEDURE — 96372 THER/PROPH/DIAG INJ SC/IM: CPT | Performed by: PHYSICIAN ASSISTANT

## 2024-01-17 PROCEDURE — 99213 OFFICE O/P EST LOW 20 MIN: CPT | Performed by: PHYSICIAN ASSISTANT

## 2024-01-17 RX ORDER — PREDNISONE 10 MG/1
TABLET ORAL
Qty: 26 TABLET | Refills: 0 | Status: SHIPPED | OUTPATIENT
Start: 2024-01-17

## 2024-01-17 RX ORDER — DEXAMETHASONE SODIUM PHOSPHATE 10 MG/ML
10 INJECTION, SOLUTION INTRAMUSCULAR; INTRAVENOUS ONCE
Status: COMPLETED | OUTPATIENT
Start: 2024-01-17 | End: 2024-01-17

## 2024-01-17 RX ADMIN — DEXAMETHASONE SODIUM PHOSPHATE 10 MG: 10 INJECTION, SOLUTION INTRAMUSCULAR; INTRAVENOUS at 18:48

## 2024-01-17 NOTE — PROGRESS NOTES
[facility]    NAME: Jose Scott is a 48 y.o. male  : 1975    MRN: [id number]  DATE: 2024  TIME: 6:47 PM    Assessment and Plan   Acute gout of right foot, unspecified cause [M10.9]  1. Acute gout of right foot, unspecified cause  dexamethasone (PF) (DECADRON) injection 10 mg    predniSONE 10 mg tablet          Patient Instructions     Patient Instructions   Decadron given here.  Start prednisone tomorrow.    Follow up with pcp.    If worsening, go to the emergency room.      Chief Complaint     Chief Complaint   Patient presents with    Foot Pain     Hx gout , pain since        History of Present Illness   48-year-old male here with complaint of right foot pain.  History of gout. Has pain, swelling, slight redness right MTP joint area.  Pain with anything touching it.          Review of Systems   Review of Systems   Constitutional:  Negative for chills and fever.   Respiratory:  Negative for cough and shortness of breath.    Cardiovascular:  Negative for chest pain.   Musculoskeletal:  Positive for arthralgias (right foot pain).       Current Medications     Current Outpatient Medications:     albuterol (PROVENTIL HFA,VENTOLIN HFA) 90 mcg/act inhaler, INHALE 2 PUFFS BY MOUTH AND INTO THE LUNGS EVERY 6 HOURS AS NEEDED FOR WHEEZING, Disp: , Rfl:     predniSONE 10 mg tablet, Take 3 tabs BID X 2 days, 2 tabs BID X 2 days, 1 tab BID X 2 days, 1 tab daily X 2 days, Disp: 26 tablet, Rfl: 0    tiotropium (Spiriva HandiHaler) 18 mcg inhalation capsule, Place 1 capsule into inhaler and inhale daily, Disp: , Rfl:     doxycycline hyclate (VIBRA-TABS) 100 mg tablet, take 1 tablet by mouth twice a day for 7 days (Patient not taking: Reported on 2024), Disp: , Rfl:     fluticasone (FLONASE) 50 mcg/act nasal spray, 2 sprays daily (Patient not taking: Reported on 2024), Disp: , Rfl:     hydrOXYzine HCL (ATARAX) 25 mg tablet, Take 1 tablet (25 mg total) by mouth every 6 (six) hours as  needed for itching (Patient not taking: Reported on 1/17/2024), Disp: 20 tablet, Rfl: 0    methylPREDNISolone 4 MG tablet therapy pack, Use as directed on package (Patient not taking: Reported on 1/17/2024), Disp: 1 each, Rfl: 0    triamcinolone (KENALOG) 0.1 % cream, Apply 1 Application topically 2 (two) times a day (Patient not taking: Reported on 1/17/2024), Disp: , Rfl:     Current Facility-Administered Medications:     dexamethasone (PF) (DECADRON) injection 10 mg, 10 mg, Intramuscular, Once, Michelle Behler, PA-C    Current Allergies     Allergies as of 01/17/2024 - Reviewed 01/17/2024   Allergen Reaction Noted    Penicillins Hives and Throat Swelling 10/19/2009    Gabapentin  10/04/2020    Toradol [ketorolac tromethamine]  10/05/2020    Tramadol Other (See Comments) 02/25/2019          The following portions of the patient's history were reviewed and updated as appropriate: allergies, current medications, past family history, past medical history, past social history, past surgical history and problem list.   Past Medical History:   Diagnosis Date    Gout      Past Surgical History:   Procedure Laterality Date    ARTHROSCOPY KNEE      FASCIOTOMY      LEG SURGERY      OSTEOTOMY      TONSILLECTOMY      VASECTOMY       History reviewed. No pertinent family history.  Social History     Socioeconomic History    Marital status: /Civil Union     Spouse name: Not on file    Number of children: Not on file    Years of education: Not on file    Highest education level: Not on file   Occupational History    Not on file   Tobacco Use    Smoking status: Former    Smokeless tobacco: Current     Types: Chew   Vaping Use    Vaping status: Never Used   Substance and Sexual Activity    Alcohol use: Not Currently    Drug use: Never    Sexual activity: Not on file   Other Topics Concern    Not on file   Social History Narrative    Not on file     Social Determinants of Health     Financial Resource Strain: Low Risk   (2/14/2023)    Received from [facility]    Overall Financial Resource Strain (CARDIA)     Difficulty of Paying Living Expenses: Not very hard   Food Insecurity: Unknown (2/14/2023)    Received from [facility]    Hunger Vital Sign     Worried About Running Out of Food in the Last Year: Patient refused     Ran Out of Food in the Last Year: Patient refused   Transportation Needs: No Transportation Needs (2/14/2023)    Received from [facility]    PRAPARE - Transportation     Lack of Transportation (Medical): No     Lack of Transportation (Non-Medical): No   Physical Activity: Not on file   Stress: Not on file   Social Connections: Moderately Isolated (2/14/2023)    Received from [facility]    Social Connection and Isolation Panel [NHANES]     Frequency of Communication with Friends and Family: More than three times a week     Frequency of Social Gatherings with Friends and Family: More than three times a week     Attends Mandaeism Services: Never     Active Member of Clubs or Organizations: No     Attends Club or Organization Meetings: Never     Marital Status:    Intimate Partner Violence: Not At Risk (2/14/2023)    Received from [facility]    Humiliation, Afraid, Rape, and Kick questionnaire     Fear of Current or Ex-Partner: No     Emotionally Abused: No     Physically Abused: No     Sexually Abused: No   Housing Stability: Low Risk  (2/14/2023)    Received from [facility]    Housing Stability Vital Sign     Unable to Pay for Housing in the Last Year: No     Number of Places Lived in the Last Year: 1     Unstable Housing in the Last Year: No     Medications have been verified.    Objective   /81   Pulse 88   Temp 99.9 °F (37.7 °C)   Resp 22   SpO2 98%      Physical Exam   Physical Exam  Vitals and nursing note reviewed.   Constitutional:       Appearance: Normal appearance.   Cardiovascular:       Rate and Rhythm: Normal rate and regular rhythm.      Pulses: Normal pulses.      Heart sounds: Normal heart sounds.   Musculoskeletal:      Right foot: Decreased range of motion. Normal capillary refill. Swelling and tenderness present. Normal pulse.      Comments: Diffuse dry papular rash   Neurological:      Mental Status: He is alert.

## 2024-01-17 NOTE — PATIENT INSTRUCTIONS
Decadron given here.  Start prednisone tomorrow.    Follow up with pcp.    If worsening, go to the emergency room.

## 2024-01-17 NOTE — LETTER
January 17, 2024     Patient: Jose Scott   YOB: 1975   Date of Visit: 1/17/2024       To Whom it May Concern:    Jose Scott was seen in my clinic on 1/17/2024. He may return to work on 1/19/24.    If you have any questions or concerns, please don't hesitate to call.         Sincerely,          Michelle Behler, PA-C        CC: No Recipients

## 2024-02-28 ENCOUNTER — OFFICE VISIT (OUTPATIENT)
Dept: URGENT CARE | Facility: CLINIC | Age: 49
End: 2024-02-28
Payer: COMMERCIAL

## 2024-02-28 VITALS
OXYGEN SATURATION: 97 % | TEMPERATURE: 97.9 F | DIASTOLIC BLOOD PRESSURE: 72 MMHG | SYSTOLIC BLOOD PRESSURE: 122 MMHG | RESPIRATION RATE: 18 BRPM | HEART RATE: 92 BPM

## 2024-02-28 DIAGNOSIS — L30.9 DERMATITIS: Primary | ICD-10-CM

## 2024-02-28 PROCEDURE — 99213 OFFICE O/P EST LOW 20 MIN: CPT

## 2024-02-28 NOTE — PROGRESS NOTES
[facility]        NAME: Jose Scott is a 48 y.o. male  : 1975    MRN: [id number]  DATE: 2024  TIME: 3:29 PM    Assessment and Plan   Dermatitis [L30.9]  1. Dermatitis          Offered steroids, declined due to his PCP telling him he cannot be on it that often. Recommended to patient to contact PCP for their recommendation. Discussed that unfortunately at  that we are limited to what I can offer and to follow up with PCP and Derm if possible.    Patient Instructions       Follow up with Primary Care Provider.  Proceed to Emergency Department if symptoms worsen.    Chief Complaint     Chief Complaint   Patient presents with   • Rash     All over very itchy was seen in oct 2023 for it          History of Present Illness       Rash in different blotches on arms, legs, abdomen, back, and neck. States it has been ongoing for several years and never really went away except when he had an accident and was on several pain medications. Has been on different steroids and creams and today was supposed to go to Dermatology but then once he got there they told him that they did not accept his insurance. States the next appointment that would be available is in 2024 but he feels so itchy and the rash bothers him so much that he would like some help with it. Was told by family PCP that it was related to his kidneys and fibrosis. Was told by others that it is contact dermatis .        Review of Systems   Review of Systems   Constitutional:  Positive for activity change. Negative for chills and fever.   Respiratory:  Negative for cough and shortness of breath.    Cardiovascular:  Negative for chest pain.   Skin:  Positive for rash.   Neurological:  Negative for dizziness, weakness and light-headedness.         Current Medications       Current Outpatient Medications:   •  albuterol (PROVENTIL HFA,VENTOLIN HFA) 90 mcg/act inhaler, INHALE 2 PUFFS BY MOUTH AND INTO THE LUNGS EVERY 6 HOURS AS  NEEDED FOR WHEEZING, Disp: , Rfl:   •  doxycycline hyclate (VIBRA-TABS) 100 mg tablet, take 1 tablet by mouth twice a day for 7 days (Patient not taking: Reported on 1/17/2024), Disp: , Rfl:   •  fluticasone (FLONASE) 50 mcg/act nasal spray, 2 sprays daily (Patient not taking: Reported on 1/17/2024), Disp: , Rfl:   •  hydrOXYzine HCL (ATARAX) 25 mg tablet, Take 1 tablet (25 mg total) by mouth every 6 (six) hours as needed for itching (Patient not taking: Reported on 1/17/2024), Disp: 20 tablet, Rfl: 0  •  methylPREDNISolone 4 MG tablet therapy pack, Use as directed on package (Patient not taking: Reported on 1/17/2024), Disp: 1 each, Rfl: 0  •  predniSONE 10 mg tablet, Take 3 tabs BID X 2 days, 2 tabs BID X 2 days, 1 tab BID X 2 days, 1 tab daily X 2 days (Patient not taking: Reported on 2/28/2024), Disp: 26 tablet, Rfl: 0  •  tiotropium (Spiriva HandiHaler) 18 mcg inhalation capsule, Place 1 capsule into inhaler and inhale daily, Disp: , Rfl:   •  triamcinolone (KENALOG) 0.1 % cream, Apply 1 Application topically 2 (two) times a day (Patient not taking: Reported on 1/17/2024), Disp: , Rfl:     Current Allergies     Allergies as of 02/28/2024 - Reviewed 02/28/2024   Allergen Reaction Noted   • Penicillins Hives and Throat Swelling 10/19/2009   • Gabapentin  10/04/2020   • Toradol [ketorolac tromethamine]  10/05/2020   • Tramadol Other (See Comments) 02/25/2019            The following portions of the patient's history were reviewed and updated as appropriate: allergies, current medications, past family history, past medical history, past social history, past surgical history and problem list.     Past Medical History:   Diagnosis Date   • Gout        Past Surgical History:   Procedure Laterality Date   • ARTHROSCOPY KNEE     • FASCIOTOMY     • LEG SURGERY     • OSTEOTOMY     • TONSILLECTOMY     • VASECTOMY         History reviewed. No pertinent family history.      Medications have been verified.        Objective    /72   Pulse 92   Temp 97.9 °F (36.6 °C)   Resp 18   SpO2 97%   No LMP for male patient.       Physical Exam     Physical Exam  Vitals and nursing note reviewed.   Constitutional:       Appearance: Normal appearance.   HENT:      Head: Normocephalic and atraumatic.   Pulmonary:      Effort: Pulmonary effort is normal.   Skin:     General: Skin is warm and dry.      Capillary Refill: Capillary refill takes less than 2 seconds.      Findings: Rash present. Rash is macular and papular.      Comments: Patches of rash in different stages all over the arms, legs, feet, neck, back, and abdomen. For several years. States there is drainage at times but none at this time. States he can feel the itchy sensation spreading to his right arm.   Neurological:      General: No focal deficit present.      Mental Status: He is alert and oriented to person, place, and time. Mental status is at baseline.      Sensory: No sensory deficit.      Motor: No weakness.   Psychiatric:         Mood and Affect: Mood normal.         Behavior: Behavior normal.         Thought Content: Thought content normal.

## 2024-04-04 ENCOUNTER — TELEPHONE (OUTPATIENT)
Age: 49
End: 2024-04-04

## 2024-04-04 NOTE — TELEPHONE ENCOUNTER
Mira from [facility] called in requesting for the following patient chart note on sleep study by Dr. Ball prior to 3/13/2023 to be faxed @ [phone] Thanks

## 2024-04-05 NOTE — TELEPHONE ENCOUNTER
Called and spoke to Mira to let her know patient is not a patient at [facility].

## 2024-06-19 ENCOUNTER — APPOINTMENT (OUTPATIENT)
Dept: URGENT CARE | Facility: CLINIC | Age: 49
End: 2024-06-19

## 2025-07-30 ENCOUNTER — TELEPHONE (OUTPATIENT)
Age: 50
End: 2025-07-30

## 2025-07-30 ENCOUNTER — APPOINTMENT (OUTPATIENT)
Dept: RADIOLOGY | Facility: CLINIC | Age: 50
End: 2025-07-30
Attending: STUDENT IN AN ORGANIZED HEALTH CARE EDUCATION/TRAINING PROGRAM
Payer: COMMERCIAL

## 2025-07-30 VITALS — WEIGHT: 285 LBS | HEIGHT: 71 IN | BODY MASS INDEX: 39.9 KG/M2

## 2025-07-30 DIAGNOSIS — Z01.89 ENCOUNTER FOR LOWER EXTREMITY COMPARISON IMAGING STUDY: ICD-10-CM

## 2025-07-30 DIAGNOSIS — M17.12 PRIMARY OSTEOARTHRITIS OF LEFT KNEE: Primary | ICD-10-CM

## 2025-07-30 DIAGNOSIS — Z98.890 HISTORY OF OPEN REDUCTION AND INTERNAL FIXATION (ORIF) PROCEDURE: ICD-10-CM

## 2025-07-30 DIAGNOSIS — M25.512 LEFT SHOULDER PAIN, UNSPECIFIED CHRONICITY: ICD-10-CM

## 2025-07-30 PROBLEM — G89.29 CHRONIC PAIN OF LEFT KNEE: Status: ACTIVE | Noted: 2025-07-30

## 2025-07-30 PROBLEM — M25.562 CHRONIC PAIN OF LEFT KNEE: Status: ACTIVE | Noted: 2025-07-30

## 2025-07-30 PROCEDURE — 73562 X-RAY EXAM OF KNEE 3: CPT

## 2025-07-30 PROCEDURE — 99204 OFFICE O/P NEW MOD 45 MIN: CPT | Performed by: STUDENT IN AN ORGANIZED HEALTH CARE EDUCATION/TRAINING PROGRAM

## 2025-07-30 PROCEDURE — 73560 X-RAY EXAM OF KNEE 1 OR 2: CPT

## 2025-07-30 RX ORDER — MELOXICAM 15 MG/1
15 TABLET ORAL DAILY
Qty: 30 TABLET | Refills: 0 | Status: SHIPPED | OUTPATIENT
Start: 2025-07-30

## 2025-08-10 ENCOUNTER — HOSPITAL ENCOUNTER (OUTPATIENT)
Dept: MRI IMAGING | Facility: HOSPITAL | Age: 50
Discharge: HOME | End: 2025-08-10
Payer: COMMERCIAL

## 2025-08-21 ENCOUNTER — OFFICE VISIT (OUTPATIENT)
Dept: OBGYN CLINIC | Facility: CLINIC | Age: 50
End: 2025-08-21
Payer: COMMERCIAL

## 2025-08-21 VITALS — WEIGHT: 282 LBS | HEIGHT: 71 IN | BODY MASS INDEX: 39.48 KG/M2

## 2025-08-21 DIAGNOSIS — M17.12 PRIMARY OSTEOARTHRITIS OF LEFT KNEE: Primary | ICD-10-CM

## 2025-08-21 PROCEDURE — 99213 OFFICE O/P EST LOW 20 MIN: CPT | Performed by: STUDENT IN AN ORGANIZED HEALTH CARE EDUCATION/TRAINING PROGRAM

## 2025-08-21 PROCEDURE — 20610 DRAIN/INJ JOINT/BURSA W/O US: CPT | Performed by: STUDENT IN AN ORGANIZED HEALTH CARE EDUCATION/TRAINING PROGRAM

## 2025-08-21 RX ORDER — BUPIVACAINE HYDROCHLORIDE 2.5 MG/ML
4 INJECTION, SOLUTION INFILTRATION; PERINEURAL
Status: COMPLETED | OUTPATIENT
Start: 2025-08-21 | End: 2025-08-21

## 2025-08-21 RX ORDER — METHYLPREDNISOLONE ACETATE 40 MG/ML
1 INJECTION, SUSPENSION INTRA-ARTICULAR; INTRALESIONAL; INTRAMUSCULAR; SOFT TISSUE
Status: COMPLETED | OUTPATIENT
Start: 2025-08-21 | End: 2025-08-21

## 2025-08-21 RX ORDER — LIDOCAINE HYDROCHLORIDE 20 MG/ML
4 INJECTION, SOLUTION INFILTRATION; PERINEURAL
Status: COMPLETED | OUTPATIENT
Start: 2025-08-21 | End: 2025-08-21

## 2025-08-21 RX ADMIN — BUPIVACAINE HYDROCHLORIDE 4 ML: 2.5 INJECTION, SOLUTION INFILTRATION; PERINEURAL at 08:45

## 2025-08-21 RX ADMIN — LIDOCAINE HYDROCHLORIDE 4 ML: 20 INJECTION, SOLUTION INFILTRATION; PERINEURAL at 08:45

## 2025-08-21 RX ADMIN — METHYLPREDNISOLONE ACETATE 1 ML: 40 INJECTION, SUSPENSION INTRA-ARTICULAR; INTRALESIONAL; INTRAMUSCULAR; SOFT TISSUE at 08:45
